# Patient Record
Sex: FEMALE | Race: WHITE | NOT HISPANIC OR LATINO | Employment: FULL TIME | URBAN - METROPOLITAN AREA
[De-identification: names, ages, dates, MRNs, and addresses within clinical notes are randomized per-mention and may not be internally consistent; named-entity substitution may affect disease eponyms.]

---

## 2023-11-01 ENCOUNTER — PHARMACY VISIT (OUTPATIENT)
Dept: PHARMACY | Facility: CLINIC | Age: 58
End: 2023-11-01
Payer: COMMERCIAL

## 2023-11-01 PROCEDURE — RXMED WILLOW AMBULATORY MEDICATION CHARGE

## 2023-11-01 RX ORDER — INFLUENZA VIRUS VACCINE 15; 15; 15; 15 UG/.5ML; UG/.5ML; UG/.5ML; UG/.5ML
SUSPENSION INTRAMUSCULAR
Qty: 0.5 ML | Refills: 0 | OUTPATIENT
Start: 2023-11-01

## 2023-11-19 DIAGNOSIS — F41.9 ANXIETY: Primary | ICD-10-CM

## 2023-11-20 ENCOUNTER — PHARMACY VISIT (OUTPATIENT)
Dept: PHARMACY | Facility: CLINIC | Age: 58
End: 2023-11-20
Payer: COMMERCIAL

## 2023-11-20 PROCEDURE — RXMED WILLOW AMBULATORY MEDICATION CHARGE

## 2023-11-20 RX ORDER — CITALOPRAM 20 MG/1
20 TABLET, FILM COATED ORAL DAILY
Qty: 90 TABLET | Refills: 2 | Status: SHIPPED | OUTPATIENT
Start: 2023-11-20 | End: 2023-12-08 | Stop reason: SDUPTHER

## 2023-12-08 ENCOUNTER — OFFICE VISIT (OUTPATIENT)
Dept: PRIMARY CARE | Facility: CLINIC | Age: 58
End: 2023-12-08
Payer: COMMERCIAL

## 2023-12-08 VITALS
WEIGHT: 190 LBS | OXYGEN SATURATION: 95 % | SYSTOLIC BLOOD PRESSURE: 113 MMHG | BODY MASS INDEX: 29.82 KG/M2 | DIASTOLIC BLOOD PRESSURE: 76 MMHG | HEART RATE: 75 BPM | HEIGHT: 67 IN

## 2023-12-08 DIAGNOSIS — E03.9 ADULT HYPOTHYROIDISM: ICD-10-CM

## 2023-12-08 DIAGNOSIS — Z00.00 ROUTINE GENERAL MEDICAL EXAMINATION AT A HEALTH CARE FACILITY: ICD-10-CM

## 2023-12-08 DIAGNOSIS — F41.9 ANXIETY: ICD-10-CM

## 2023-12-08 DIAGNOSIS — Z12.31 BREAST CANCER SCREENING BY MAMMOGRAM: Primary | ICD-10-CM

## 2023-12-08 LAB
25(OH)D3 SERPL-MCNC: 36 NG/ML (ref 30–100)
ALBUMIN SERPL BCP-MCNC: 4.6 G/DL (ref 3.4–5)
ALP SERPL-CCNC: 102 U/L (ref 33–110)
ALT SERPL W P-5'-P-CCNC: 16 U/L (ref 7–45)
ANION GAP SERPL CALC-SCNC: 12 MMOL/L (ref 10–20)
AST SERPL W P-5'-P-CCNC: 18 U/L (ref 9–39)
BASOPHILS # BLD AUTO: 0.04 X10*3/UL (ref 0–0.1)
BASOPHILS NFR BLD AUTO: 0.6 %
BILIRUB SERPL-MCNC: 0.4 MG/DL (ref 0–1.2)
BUN SERPL-MCNC: 12 MG/DL (ref 6–23)
CALCIUM SERPL-MCNC: 10.2 MG/DL (ref 8.6–10.6)
CHLORIDE SERPL-SCNC: 104 MMOL/L (ref 98–107)
CHOLEST SERPL-MCNC: 192 MG/DL (ref 0–199)
CHOLESTEROL/HDL RATIO: 4.5
CO2 SERPL-SCNC: 29 MMOL/L (ref 21–32)
CREAT SERPL-MCNC: 0.83 MG/DL (ref 0.5–1.05)
EOSINOPHIL # BLD AUTO: 0.18 X10*3/UL (ref 0–0.7)
EOSINOPHIL NFR BLD AUTO: 2.9 %
ERYTHROCYTE [DISTWIDTH] IN BLOOD BY AUTOMATED COUNT: 13 % (ref 11.5–14.5)
EST. AVERAGE GLUCOSE BLD GHB EST-MCNC: 94 MG/DL
GFR SERPL CREATININE-BSD FRML MDRD: 83 ML/MIN/1.73M*2
GLUCOSE SERPL-MCNC: 94 MG/DL (ref 74–99)
HBA1C MFR BLD: 4.9 %
HCT VFR BLD AUTO: 42.2 % (ref 36–46)
HDLC SERPL-MCNC: 42.8 MG/DL
HGB BLD-MCNC: 13.7 G/DL (ref 12–16)
IMM GRANULOCYTES # BLD AUTO: 0.01 X10*3/UL (ref 0–0.7)
IMM GRANULOCYTES NFR BLD AUTO: 0.2 % (ref 0–0.9)
LDLC SERPL CALC-MCNC: 97 MG/DL
LYMPHOCYTES # BLD AUTO: 2.14 X10*3/UL (ref 1.2–4.8)
LYMPHOCYTES NFR BLD AUTO: 34 %
MCH RBC QN AUTO: 30.9 PG (ref 26–34)
MCHC RBC AUTO-ENTMCNC: 32.5 G/DL (ref 32–36)
MCV RBC AUTO: 95 FL (ref 80–100)
MONOCYTES # BLD AUTO: 0.54 X10*3/UL (ref 0.1–1)
MONOCYTES NFR BLD AUTO: 8.6 %
NEUTROPHILS # BLD AUTO: 3.38 X10*3/UL (ref 1.2–7.7)
NEUTROPHILS NFR BLD AUTO: 53.7 %
NON HDL CHOLESTEROL: 149 MG/DL (ref 0–149)
NRBC BLD-RTO: 0 /100 WBCS (ref 0–0)
PLATELET # BLD AUTO: 254 X10*3/UL (ref 150–450)
POTASSIUM SERPL-SCNC: 4.7 MMOL/L (ref 3.5–5.3)
PROT SERPL-MCNC: 7.2 G/DL (ref 6.4–8.2)
RBC # BLD AUTO: 4.44 X10*6/UL (ref 4–5.2)
SODIUM SERPL-SCNC: 140 MMOL/L (ref 136–145)
TRIGL SERPL-MCNC: 259 MG/DL (ref 0–149)
TSH SERPL-ACNC: 0.82 MIU/L (ref 0.44–3.98)
VLDL: 52 MG/DL (ref 0–40)
WBC # BLD AUTO: 6.3 X10*3/UL (ref 4.4–11.3)

## 2023-12-08 PROCEDURE — 85025 COMPLETE CBC W/AUTO DIFF WBC: CPT

## 2023-12-08 PROCEDURE — 83036 HEMOGLOBIN GLYCOSYLATED A1C: CPT

## 2023-12-08 PROCEDURE — 82306 VITAMIN D 25 HYDROXY: CPT

## 2023-12-08 PROCEDURE — 36415 COLL VENOUS BLD VENIPUNCTURE: CPT

## 2023-12-08 PROCEDURE — 99396 PREV VISIT EST AGE 40-64: CPT | Performed by: INTERNAL MEDICINE

## 2023-12-08 PROCEDURE — 80061 LIPID PANEL: CPT

## 2023-12-08 PROCEDURE — 80053 COMPREHEN METABOLIC PANEL: CPT

## 2023-12-08 PROCEDURE — 84443 ASSAY THYROID STIM HORMONE: CPT

## 2023-12-08 RX ORDER — LEVOTHYROXINE SODIUM 75 UG/1
TABLET ORAL
Qty: 65 TABLET | Refills: 2 | Status: SHIPPED | OUTPATIENT
Start: 2023-12-08 | End: 2024-12-06

## 2023-12-08 RX ORDER — LORAZEPAM 0.5 MG/1
0.5 TABLET ORAL DAILY PRN
Qty: 30 TABLET | Refills: 0 | Status: SHIPPED | OUTPATIENT
Start: 2023-12-08

## 2023-12-08 RX ORDER — CITALOPRAM 20 MG/1
20 TABLET, FILM COATED ORAL DAILY
Qty: 90 TABLET | Refills: 2 | Status: SHIPPED | OUTPATIENT
Start: 2023-12-08 | End: 2024-12-06

## 2023-12-08 RX ORDER — LEVOTHYROXINE SODIUM 88 UG/1
TABLET ORAL
Qty: 25 TABLET | Refills: 2 | Status: SHIPPED | OUTPATIENT
Start: 2023-12-08 | End: 2024-12-06

## 2023-12-08 NOTE — PROGRESS NOTES
Chery Long is a 57 yo F presenting for wellness visit.      1. BHARGAV   -Wellbutrin 300   -citalopram 20 mg     2. Hypothryoidism   -Synthroid 75 5 days and 88 mcg 2 days   -due repeat tfts      3. R finger fx 8/2021      4. GERD  -mild intermittent symptoms 2x per week. no alarm symptoms  -continue PRN pepcid    5. Atopic dermatitis   -FU derm   -started Dupixent in interval      #HM   -due labs   -mammo due 1.19.24 -tdap 2016   -shingrix utd   -covid utd   -cscope 8/18 - 10 years   -pap 2019 with gyne       67   196 --> 190       FH   Adopted but did find birth mother this year; mother passed in 50s from metastatic melanoma, had asthma and obesity as well.        Graduate degree in business; one daughter is 17 yo - senior in  applying to [employer]     Gen Aox3 NAD well appearing   HEENT mmm pharynx clear no cervical LAD   Eyes sclerae clear   CV rrr nl s1/2 no m/r/g  Pulm CTAB no adventitious sounds   Ext warm dry no edema 2+ DP pulse       Chery Long is a 57 yo F presenting for wellness visit.      1. BHARGAV   -Wellbutrin 300   -citalopram 20 mg  -very rare lorazepam PRN for breakthrough sxs   -last rx 30 tabs in 12.7.22   -will renew same      2. Hypothryoidism   -Synthroid 75 5 days and 88 mcg 2 days   -due repeat tfts      3. R finger fx 8/2021      4. GERD  -mild intermittent symptoms 2x per week. no alarm symptoms  -continue PRN pepcid    5. Atopic dermatitis   -FU derm   -started Dupixent in interval      #HM   -due labs   -mammo due 1.19.24 -tdap 2016   -shingrix utd   -covid utd   -cscope 8/18 - 10 years   -pap 2019 with gyne

## 2023-12-08 NOTE — PATIENT INSTRUCTIONS
Chery,     We are doing full labs today.   Call [phone] to schedule mammogram 1-19-24 or later.   See me in a year, sooner if needed!     CL

## 2023-12-11 ENCOUNTER — PHARMACY VISIT (OUTPATIENT)
Dept: PHARMACY | Facility: CLINIC | Age: 58
End: 2023-12-11
Payer: COMMERCIAL

## 2023-12-11 PROCEDURE — RXMED WILLOW AMBULATORY MEDICATION CHARGE

## 2023-12-19 ENCOUNTER — OFFICE VISIT (OUTPATIENT)
Dept: OBSTETRICS AND GYNECOLOGY | Facility: CLINIC | Age: 58
End: 2023-12-19
Payer: COMMERCIAL

## 2023-12-19 VITALS
WEIGHT: 189 LBS | DIASTOLIC BLOOD PRESSURE: 70 MMHG | SYSTOLIC BLOOD PRESSURE: 112 MMHG | BODY MASS INDEX: 29.66 KG/M2 | HEIGHT: 67 IN

## 2023-12-19 DIAGNOSIS — Z01.419 ENCOUNTER FOR WELL WOMAN EXAM WITH ROUTINE GYNECOLOGICAL EXAM: Primary | ICD-10-CM

## 2023-12-19 DIAGNOSIS — Z12.31 ENCOUNTER FOR SCREENING MAMMOGRAM FOR BREAST CANCER: ICD-10-CM

## 2023-12-19 PROCEDURE — 1036F TOBACCO NON-USER: CPT | Performed by: OBSTETRICS & GYNECOLOGY

## 2023-12-19 PROCEDURE — 99396 PREV VISIT EST AGE 40-64: CPT | Performed by: OBSTETRICS & GYNECOLOGY

## 2023-12-19 NOTE — PROGRESS NOTES
"PAP 19 NEG HPV NEG  MAMMO 23  DEXA NEVER  COLON 18 NL/ 10 years    ASSESSMENT/PLAN      SUBJECTIVE    HPI    57 yo G0 (1 adopted dtr) for routine well woman exam.  Last seen 2022.   Menopausal since age 51. No bleeding.   Continues on Buproprion for vaginismus, depression,  Up to date with PCP visits.  Denies any other intervening medical or surgical issues.   , non smoker, rare ETOH.  Adopted and this past year found out about birth family history. Birth mother  age 55 malignant melanoma.      Review of Systems    Constitutional: no fever, no chills, no recent weight gain, no recent weight loss and no fatigue.   Eyes: no eye pain, no vision problems and no dryness of the eyes.   ENT: no hearing loss, no nosebleeds and no sinus congestion.   Cardiovascular: no chest pain, no palpitations and no orthopnea.   Respiratory: no shortness of breath, no cough and no wheezing.   Gastrointestinal: no abdominal pain, no constipation, no nausea, no diarrhea and no vomiting.   Genitourinary: no dysuria, no urinary incontinence, no vaginal dryness, no vaginal itching, no dyspareunia, no pelvic pain, no dysmenorrhea, no sexual problems, no change in urinary frequency, no vaginal discharge, no unexplained vaginal bleeding and no lesion/sore.   Musculoskeletal: no back pain, no joint swelling and no leg edema.   Integumentary: no rashes, no skin lesions, no nipple discharge, no breast pain and no breast lump.   Neurological: no headache, no numbness and no dizziness.   Psychiatric: no sleep disturbances, + anxiety and depression.   Endocrine: no hot flashes, no loss of hair and no hirsutism.   Hematologic/Lymphatic: no swollen glands, no tendency for easy bleeding and no tendency for easy bruising.   All other systems have been reviewed and are negative for complaint.     OBJECTIVE    /70   Ht 1.702 m (5' 7\")   Wt 85.7 kg (189 lb)   BMI 29.60 kg/m²      Physical Exam     Constitutional: Alert " and in no acute distress. Well developed, well nourished   Head and Face: Head and face: normal   Eyes: Normal external exam - nonicteric sclera, extraocular movements intact (EOMI) and no ptosis.   Ears, Nose, Mouth, and Throat: External inspection of ears and nose: normal   Neck: no neck asymmetry. Supple and thyroid not enlarged and there were no palpable thyroid nodules   Cardiovascular: Heart rate and rhythm were normal, normal S1 and S2, no gallops, and no murmurs   Pulmonary: No respiratory distress and clear bilateral breath sounds   Chest: Breasts: normal appearance, no nipple discharge and no skin changes and palpation of breasts and axillae: no palpable mass and no axillary lymphadenopathy   Abdomen: soft nontender; no abdominal mass palpated, no organomegaly and no hernias   Genitourinary: external genitalia: normal, no inguinal lymphadenopathy, Bartholin's urethral and Sealy's glands: normal, urethra: normal, bladder: normal on palpation and perianal area: normal   Vagina: normal.   Cervix: Normal appearing without lesions.  Uterus: Normal, mobile, nontender.  Right Adnexa/parametria: Normal.   Left Adnexa/parametria: Normal.   Skin: normal skin color and pigmentation, normal skin turgor, and no rash.   Psychiatric: alert and oriented x 3., affect normal to patient baseline and mood: appropriate      Jojo Berger MD

## 2024-01-10 PROCEDURE — RXMED WILLOW AMBULATORY MEDICATION CHARGE

## 2024-01-11 ENCOUNTER — PHARMACY VISIT (OUTPATIENT)
Dept: PHARMACY | Facility: CLINIC | Age: 59
End: 2024-01-11
Payer: COMMERCIAL

## 2024-01-22 ENCOUNTER — HOSPITAL ENCOUNTER (OUTPATIENT)
Dept: RADIOLOGY | Facility: CLINIC | Age: 59
Discharge: HOME | End: 2024-01-22
Payer: COMMERCIAL

## 2024-01-22 VITALS — BODY MASS INDEX: 29.65 KG/M2 | WEIGHT: 188.93 LBS | HEIGHT: 67 IN

## 2024-01-22 DIAGNOSIS — Z12.31 ENCOUNTER FOR SCREENING MAMMOGRAM FOR BREAST CANCER: ICD-10-CM

## 2024-01-22 PROCEDURE — 77067 SCR MAMMO BI INCL CAD: CPT | Performed by: RADIOLOGY

## 2024-01-22 PROCEDURE — 77063 BREAST TOMOSYNTHESIS BI: CPT | Performed by: RADIOLOGY

## 2024-01-22 PROCEDURE — 77067 SCR MAMMO BI INCL CAD: CPT

## 2024-02-05 PROCEDURE — RXMED WILLOW AMBULATORY MEDICATION CHARGE

## 2024-02-07 ENCOUNTER — PHARMACY VISIT (OUTPATIENT)
Dept: PHARMACY | Facility: CLINIC | Age: 59
End: 2024-02-07
Payer: COMMERCIAL

## 2024-02-29 ENCOUNTER — APPOINTMENT (OUTPATIENT)
Dept: OPHTHALMOLOGY | Facility: CLINIC | Age: 59
End: 2024-02-29
Payer: COMMERCIAL

## 2024-03-05 PROBLEM — N64.4 BREAST PAIN: Status: ACTIVE | Noted: 2024-03-05

## 2024-03-05 PROBLEM — S16.1XXA STRAIN OF NECK MUSCLE: Status: ACTIVE | Noted: 2024-03-05

## 2024-03-05 PROBLEM — R05.9 COUGH: Status: ACTIVE | Noted: 2024-03-05

## 2024-03-05 PROBLEM — R39.89 SENSATION OF PRESSURE IN BLADDER AREA: Status: ACTIVE | Noted: 2024-03-05

## 2024-03-05 PROBLEM — N95.2 ATROPHIC VAGINITIS: Status: ACTIVE | Noted: 2024-03-05

## 2024-03-05 PROBLEM — F32.A DEPRESSIVE DISORDER: Status: ACTIVE | Noted: 2024-03-05
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
## 2024-03-05 PROBLEM — F41.1 GENERALIZED ANXIETY DISORDER: Status: ACTIVE | Noted: 2024-03-05

## 2024-03-05 PROBLEM — N94.819 VULVODYNIA: Status: ACTIVE | Noted: 2024-03-05

## 2024-03-05 PROBLEM — R45.89 DEPRESSED MOOD: Status: ACTIVE | Noted: 2024-03-05

## 2024-03-05 PROBLEM — J45.990 EXERCISE-INDUCED ASTHMA (HHS-HCC): Status: ACTIVE | Noted: 2024-03-05

## 2024-03-05 PROBLEM — N95.1 MENOPAUSAL SYMPTOMS: Status: ACTIVE | Noted: 2024-03-05

## 2024-03-05 PROBLEM — G89.29 BACK PAIN, CHRONIC: Status: ACTIVE | Noted: 2024-03-05

## 2024-03-05 PROBLEM — S62.628A CLOSED FRACTURE OF MIDDLE PHALANX OF LITTLE FINGER: Status: ACTIVE | Noted: 2024-03-05

## 2024-03-05 PROBLEM — J01.90 ACUTE SINUSITIS: Status: ACTIVE | Noted: 2024-03-05

## 2024-03-05 PROBLEM — S16.1XXA CERVICAL STRAIN: Status: ACTIVE | Noted: 2024-03-05

## 2024-03-05 PROBLEM — J34.89 SINUS PRESSURE: Status: ACTIVE | Noted: 2024-03-05

## 2024-03-05 PROBLEM — R92.8 ABNORMALITY OF RIGHT BREAST ON SCREENING MAMMOGRAM: Status: ACTIVE | Noted: 2024-03-05

## 2024-03-05 PROBLEM — S80.02XA CONTUSION OF LEFT KNEE: Status: ACTIVE | Noted: 2024-03-05

## 2024-03-05 PROBLEM — R92.8 ABNORMAL MAMMOGRAM: Status: ACTIVE | Noted: 2023-01-19

## 2024-03-05 PROBLEM — J30.9 ALLERGIC RHINITIS: Status: ACTIVE | Noted: 2024-03-05

## 2024-03-05 PROBLEM — M54.9 BACK PAIN, CHRONIC: Status: ACTIVE | Noted: 2024-03-05

## 2024-03-05 PROBLEM — R14.0 ABDOMINAL BLOATING: Status: ACTIVE | Noted: 2024-03-05

## 2024-03-05 PROBLEM — E03.9 ADULT HYPOTHYROIDISM: Status: ACTIVE | Noted: 2024-03-05

## 2024-03-05 PROBLEM — R42 DIZZINESS: Status: ACTIVE | Noted: 2024-03-05

## 2024-03-05 PROBLEM — S69.91XA FINGER INJURY, RIGHT, INITIAL ENCOUNTER: Status: ACTIVE | Noted: 2024-03-05

## 2024-03-05 PROBLEM — N89.8 VAGINAL DISCHARGE: Status: ACTIVE | Noted: 2024-03-05

## 2024-03-05 PROBLEM — H44.50 DEGENERATIVE DISORDER OF EYE: Status: ACTIVE | Noted: 2024-03-05

## 2024-03-05 PROBLEM — J30.9 CHRONIC ALLERGIC RHINITIS: Status: ACTIVE | Noted: 2024-03-05

## 2024-03-05 PROBLEM — R94.6 ABNORMAL THYROID FUNCTION TEST: Status: ACTIVE | Noted: 2024-03-05

## 2024-03-05 PROBLEM — M25.562 LEFT KNEE PAIN: Status: ACTIVE | Noted: 2024-03-05

## 2024-03-05 PROBLEM — B37.31 VULVOVAGINAL CANDIDIASIS: Status: ACTIVE | Noted: 2024-03-05

## 2024-03-05 PROBLEM — H81.90 VESTIBULAR DISORDER: Status: ACTIVE | Noted: 2024-03-05

## 2024-03-05 RX ORDER — MULTIVIT/FOLIC ACID/HERBAL 223 400 MCG
TABLET ORAL
COMMUNITY
End: 2024-03-07 | Stop reason: ALTCHOICE

## 2024-03-05 RX ORDER — ALBUTEROL SULFATE 90 UG/1
AEROSOL, METERED RESPIRATORY (INHALATION)
COMMUNITY
Start: 2017-04-04

## 2024-03-05 RX ORDER — AZELASTINE HCL 205.5 UG/1
SPRAY NASAL
COMMUNITY
Start: 2018-12-18 | End: 2024-03-07 | Stop reason: ALTCHOICE

## 2024-03-07 ENCOUNTER — OFFICE VISIT (OUTPATIENT)
Dept: OTOLARYNGOLOGY | Facility: CLINIC | Age: 59
End: 2024-03-07
Payer: COMMERCIAL

## 2024-03-07 VITALS
OXYGEN SATURATION: 98 % | HEART RATE: 67 BPM | SYSTOLIC BLOOD PRESSURE: 113 MMHG | RESPIRATION RATE: 16 BRPM | DIASTOLIC BLOOD PRESSURE: 72 MMHG | TEMPERATURE: 98.6 F

## 2024-03-07 DIAGNOSIS — J34.89 NASAL DRYNESS: ICD-10-CM

## 2024-03-07 DIAGNOSIS — J34.2 NASAL SEPTAL DEVIATION: ICD-10-CM

## 2024-03-07 DIAGNOSIS — R06.83 SNORING: ICD-10-CM

## 2024-03-07 DIAGNOSIS — R44.8 FACIAL PRESSURE: ICD-10-CM

## 2024-03-07 DIAGNOSIS — R04.0 EPISTAXIS: ICD-10-CM

## 2024-03-07 DIAGNOSIS — J31.0 CHRONIC RHINITIS: Primary | ICD-10-CM

## 2024-03-07 PROCEDURE — 99213 OFFICE O/P EST LOW 20 MIN: CPT | Performed by: STUDENT IN AN ORGANIZED HEALTH CARE EDUCATION/TRAINING PROGRAM

## 2024-03-07 PROCEDURE — 99203 OFFICE O/P NEW LOW 30 MIN: CPT | Performed by: STUDENT IN AN ORGANIZED HEALTH CARE EDUCATION/TRAINING PROGRAM

## 2024-03-07 PROCEDURE — 1036F TOBACCO NON-USER: CPT | Performed by: STUDENT IN AN ORGANIZED HEALTH CARE EDUCATION/TRAINING PROGRAM

## 2024-03-07 RX ORDER — RUXOLITINIB 15 MG/G
CREAM TOPICAL
COMMUNITY
Start: 2024-02-27

## 2024-03-07 RX ORDER — TRIAMCINOLONE ACETONIDE 55 UG/1
2 SPRAY, METERED NASAL DAILY
Qty: 16.9 ML | Refills: 11 | Status: SHIPPED | OUTPATIENT
Start: 2024-03-07 | End: 2025-03-07

## 2024-03-07 SDOH — ECONOMIC STABILITY: FOOD INSECURITY: WITHIN THE PAST 12 MONTHS, THE FOOD YOU BOUGHT JUST DIDN'T LAST AND YOU DIDN'T HAVE MONEY TO GET MORE.: NEVER TRUE

## 2024-03-07 SDOH — ECONOMIC STABILITY: FOOD INSECURITY: WITHIN THE PAST 12 MONTHS, YOU WORRIED THAT YOUR FOOD WOULD RUN OUT BEFORE YOU GOT MONEY TO BUY MORE.: NEVER TRUE

## 2024-03-07 ASSESSMENT — LIFESTYLE VARIABLES
HOW OFTEN DO YOU HAVE A DRINK CONTAINING ALCOHOL: MONTHLY OR LESS
AUDIT-C TOTAL SCORE: 1
HOW OFTEN DO YOU HAVE SIX OR MORE DRINKS ON ONE OCCASION: NEVER
HOW MANY STANDARD DRINKS CONTAINING ALCOHOL DO YOU HAVE ON A TYPICAL DAY: 1 OR 2
SKIP TO QUESTIONS 9-10: 1

## 2024-03-07 ASSESSMENT — COLUMBIA-SUICIDE SEVERITY RATING SCALE - C-SSRS
6. HAVE YOU EVER DONE ANYTHING, STARTED TO DO ANYTHING, OR PREPARED TO DO ANYTHING TO END YOUR LIFE?: NO
2. HAVE YOU ACTUALLY HAD ANY THOUGHTS OF KILLING YOURSELF?: NO
1. IN THE PAST MONTH, HAVE YOU WISHED YOU WERE DEAD OR WISHED YOU COULD GO TO SLEEP AND NOT WAKE UP?: NO

## 2024-03-07 ASSESSMENT — ENCOUNTER SYMPTOMS
LOSS OF SENSATION IN FEET: 0
OCCASIONAL FEELINGS OF UNSTEADINESS: 0
DEPRESSION: 0

## 2024-03-07 ASSESSMENT — PAIN SCALES - GENERAL: PAINLEVEL: 2

## 2024-03-07 ASSESSMENT — PATIENT HEALTH QUESTIONNAIRE - PHQ9
SUM OF ALL RESPONSES TO PHQ9 QUESTIONS 1 AND 2: 0
2. FEELING DOWN, DEPRESSED OR HOPELESS: NOT AT ALL
1. LITTLE INTEREST OR PLEASURE IN DOING THINGS: NOT AT ALL

## 2024-03-07 NOTE — PROGRESS NOTES
"SUBJECTIVE  Patient ID: Chery Long is a 58 y.o. female who presents for New Patient Visit (Chery is here for a new visit regarding chronic pain and pressure in her sinuses.  ).    The patient reports chronic issues with sinus issues. Notes history of seasonal/environmental allergies for which she had IT in the past; 10 years ago - felt that these were helpful for seasonal symptoms. She notes lots of sinus congestion, nasal drainage, and facial pressure (mostly left maxillary - but some bilaterally). Sense of smell is \"really good.\" Frequent epistaxis in the winter. Has some exercise induced asthma. They deny a history of nasal/sinus surgery/trauma.    Previously tried saline irrigations, steroid spray (only uses during seasonal allergy seasons), and oral antihistamine (seasonally).    She also reports more recently her  has noted an increase in snoring.    Review of Systems  Complete ROS negative except as noted above or on patient intake form and as above.    OBJECTIVE  Physical Exam  CONSTITUTIONAL: Well appearing female who appears stated age. BMI 29.6.  PSYCHIATRIC: Alert, appropriate mood and affect.  RESPIRATORY: Normal inspiration and expiration and chest wall expansion; no use of accessory muscles to breathe.  VOICE: Clear speech without hoarseness. No stridor nor stertor.  HEAD, FACE, AND SKIN: Symmetric facial feature. No cutaneous masses or lesions were visualized. The parotid and submandibular glands were normal to palpation.  EYES: Pupils were equal in size and reactive to light. Extra-ocular muscle function was intact. No nystagmus was observed. Vision was grossly intact.  EARS: External ears were normally formed with no lesions. The external auditory canals were clear. The tympanic membranes were intact. Myringosclersosis with mild retraction of the right TM. No significant retraction pockets nor effusions were appreciated.  NOSE: Nasal dorsum was midline. Nasal diamond are quite narrow " bilaterally. Dry nasal mucosa with evidence of recent oozing. Anterior rhinoscopy demonstrated a septum deviated to the right. Inferior turbinates were mildly hypertrophied. Questionable polypoid edema of the left middle meatus.  ORAL CAVITY: Lips were without lesions. Moist mucous membranes. No lesions appreciated along the gingiva, oral mucosa, nor tongue.  DENTITION: Grossly normal without obvious infection nor inflammation. Mild class II malocclusion.  OROPHARYNX: Questionable exophytic papilloma vs tonsil along the left soft palate. The uvula was normal appearing. The tonsils were 1+. Class I airway.  NECK: Visualization and palpation of the neck revealed no mass lesions, no thyromegaly or thyroid masses. No cutaneous lesions appreciated.  LYMPHATICS (CERVICAL): There were no palpable lymph nodes in the posterior triangle, submandibular triangle, jugulodigastric region, nor central neck.  NEUROLOGIC: Cranial nerves III, IV, and VI were noted to be intact via extra-ocular muscle movement testing. Cranial nerve V was noted to be intact to soft touch bilaterally. Cranial nerve VII was noted to be intact and symmetric by facial movement. Cranial nerve VIII was tested with normal voice examination and revealed grossly normal hearing. Cranial nerves IX and X noted to be intact by palatal movement. Cranial nerve XI noted to be intact via shoulder shrug.  Cranial nerve XII noted to be intact with active and symmetric tongue movement.      ASSESSMENT/PLAN  Diagnoses and all orders for this visit:  Chronic rhinitis  -     triamcinolone (Nasacort) 55 mcg nasal inhaler; Administer 2 sprays into each nostril once daily.  Snoring  Nasal septal deviation  Epistaxis  Nasal dryness  Facial pressure    58 y.o. female presenting with history of environmental allergies.    1.  Chronic/allergic rhinitis, facial pressure, nasal congestion/drainage, nasal dryness/epistaxis, questionable polypoid edema  The patient is noting  longstanding history of environmental allergies now status post immunotherapy some 10 years ago.  She reports that this was helpful with seasonal allergies but more recently has been having perennial symptoms including facial pressure, nasal congestion, and nasal drainage. On exam they have very dry nasal mucosa with narrow nasal sill's limiting evaluation of the nasal cavities.  There appears to be some generalized edema as well as potential polypoid edema of the left middle meatus.    Endoscopy: Deferred by patient  Imaging: None available    I discussed my findings with the patient and offered reassurance and counseling. The patient has evidence of chronic inflammation of the nasal cavity as appreciated on physical exam. I recommended the following therapeutic measures to address the issues noted above:    -Start nasal steroid spray 2 sprays daily bilaterally. I recommended at least a one-month trial. We discussed the proper method for directing the nasal spray toward the lateral nasal wall for optimal results.  -Start sinonasal saline irrigations to be performed prior to application of nasal steroid spray. Ideally this will be performed daily.  -Follow up in 2-3 months.  -We will discuss further options should medical management not succeed.    2.  Snoring  Patient also reports more recent onset of snoring.  Her physical exam does not have significant signs of obstruction other than at the nasal cavity.  For now we will focus on treatment of chronic rhinitis and will reassess with nasal endoscopy as needed in the future.    This note was created using speech recognition transcription software. Despite proofreading, typographical errors may be present that affect the meaning of the content. Please contact my office with any questions.

## 2024-03-09 DIAGNOSIS — F32.89 OTHER DEPRESSION: Primary | ICD-10-CM

## 2024-03-09 PROCEDURE — RXMED WILLOW AMBULATORY MEDICATION CHARGE

## 2024-03-10 PROCEDURE — RXMED WILLOW AMBULATORY MEDICATION CHARGE

## 2024-03-11 PROCEDURE — RXMED WILLOW AMBULATORY MEDICATION CHARGE

## 2024-03-11 RX ORDER — BUPROPION HYDROCHLORIDE 300 MG/1
300 TABLET ORAL DAILY
Qty: 90 TABLET | Refills: 3 | Status: SHIPPED | OUTPATIENT
Start: 2024-03-11 | End: 2025-03-10

## 2024-03-15 ENCOUNTER — PHARMACY VISIT (OUTPATIENT)
Dept: PHARMACY | Facility: CLINIC | Age: 59
End: 2024-03-15
Payer: COMMERCIAL

## 2024-03-18 ENCOUNTER — SPECIALTY PHARMACY (OUTPATIENT)
Dept: PHARMACY | Facility: CLINIC | Age: 59
End: 2024-03-18

## 2024-03-19 ENCOUNTER — SPECIALTY PHARMACY (OUTPATIENT)
Dept: PHARMACY | Facility: CLINIC | Age: 59
End: 2024-03-19

## 2024-03-29 ENCOUNTER — SPECIALTY PHARMACY (OUTPATIENT)
Dept: PHARMACY | Facility: CLINIC | Age: 59
End: 2024-03-29

## 2024-06-04 ENCOUNTER — SPECIALTY PHARMACY (OUTPATIENT)
Dept: PHARMACY | Facility: CLINIC | Age: 59
End: 2024-06-04

## 2024-06-04 ENCOUNTER — PHARMACY VISIT (OUTPATIENT)
Dept: PHARMACY | Facility: CLINIC | Age: 59
End: 2024-06-04
Payer: COMMERCIAL

## 2024-06-04 PROCEDURE — RXMED WILLOW AMBULATORY MEDICATION CHARGE

## 2024-06-05 ENCOUNTER — SPECIALTY PHARMACY (OUTPATIENT)
Dept: PHARMACY | Facility: CLINIC | Age: 59
End: 2024-06-05

## 2024-06-05 NOTE — PROGRESS NOTES
[facility] Clinical Note    Chery Long is a 59 y.o. female, who is on the specialty pharmacy service of: [facility].  Chery Long is taking: dupixent.     Chery was contacted on 6/5/2024.    Refer to the encounter summary report for documentation details about patient counseling and education.      Impression/Plan  Is patient high risk? (potential patients:  pregnancy, geriatric, pediatric)   no  Is laboratory follow-up needed? no  Is a clinical intervention needed?  no  Next assessment date?  9/5/24  Additional comments:    Medication Adherence  The importance of adherence was discussed with the patient and they were advised to take the medication as prescribed by their provider. Chery was encouraged to call her physician's office if they have a question regarding a missed dose.        Patient advised to contact the pharmacy if there are any changes to her medication list, including prescriptions, OTC medications, herbal products, or supplements. Patient was advised of [facility]’s dispensing process, refill timeline, contact information ([phone]), and patient management follow up. Patient confirmed understanding of education conducted during assessment. All patient questions and concerns were addressed to the best of my ability. Patient was encouraged to contact the specialty pharmacy with any questions or concerns.    Confirmed follow-up outreaches are properly scheduled. Reviewed goals of therapy in the program targets.    Santiago Brandt, MelquiadesD

## 2024-06-10 PROCEDURE — RXMED WILLOW AMBULATORY MEDICATION CHARGE

## 2024-06-11 ENCOUNTER — PHARMACY VISIT (OUTPATIENT)
Dept: PHARMACY | Facility: CLINIC | Age: 59
End: 2024-06-11
Payer: COMMERCIAL

## 2024-06-12 ENCOUNTER — PHARMACY VISIT (OUTPATIENT)
Dept: PHARMACY | Facility: CLINIC | Age: 59
End: 2024-06-12
Payer: COMMERCIAL

## 2024-06-12 PROCEDURE — RXMED WILLOW AMBULATORY MEDICATION CHARGE

## 2024-06-12 RX ORDER — FLUTICASONE PROPIONATE 50 MCG
SPRAY, SUSPENSION (ML) NASAL
Qty: 16 G | Refills: 0 | OUTPATIENT
Start: 2024-06-12

## 2024-06-12 RX ORDER — AMOXICILLIN AND CLAVULANATE POTASSIUM 875; 125 MG/1; MG/1
TABLET, FILM COATED ORAL
Qty: 14 TABLET | Refills: 0 | OUTPATIENT
Start: 2024-06-12

## 2024-06-25 PROCEDURE — RXMED WILLOW AMBULATORY MEDICATION CHARGE

## 2024-06-26 ENCOUNTER — PHARMACY VISIT (OUTPATIENT)
Dept: PHARMACY | Facility: CLINIC | Age: 59
End: 2024-06-26
Payer: COMMERCIAL

## 2024-06-28 ENCOUNTER — SPECIALTY PHARMACY (OUTPATIENT)
Dept: PHARMACY | Facility: CLINIC | Age: 59
End: 2024-06-28

## 2024-07-02 PROCEDURE — RXMED WILLOW AMBULATORY MEDICATION CHARGE

## 2024-07-11 ENCOUNTER — PHARMACY VISIT (OUTPATIENT)
Dept: PHARMACY | Facility: CLINIC | Age: 59
End: 2024-07-11
Payer: COMMERCIAL

## 2024-07-18 PROCEDURE — RXMED WILLOW AMBULATORY MEDICATION CHARGE

## 2024-07-19 ENCOUNTER — LAB (OUTPATIENT)
Dept: LAB | Facility: LAB | Age: 59
End: 2024-07-19
Payer: COMMERCIAL

## 2024-07-19 ENCOUNTER — PHARMACY VISIT (OUTPATIENT)
Dept: PHARMACY | Facility: CLINIC | Age: 59
End: 2024-07-19
Payer: COMMERCIAL

## 2024-07-19 DIAGNOSIS — R53.83 OTHER FATIGUE: ICD-10-CM

## 2024-07-19 LAB
25(OH)D3 SERPL-MCNC: 34 NG/ML (ref 30–100)
BASOPHILS # BLD AUTO: 0.03 X10*3/UL (ref 0–0.1)
BASOPHILS NFR BLD AUTO: 0.4 %
EOSINOPHIL # BLD AUTO: 0.12 X10*3/UL (ref 0–0.7)
EOSINOPHIL NFR BLD AUTO: 1.7 %
ERYTHROCYTE [DISTWIDTH] IN BLOOD BY AUTOMATED COUNT: 12.3 % (ref 11.5–14.5)
HCT VFR BLD AUTO: 43.1 % (ref 36–46)
HGB BLD-MCNC: 14.3 G/DL (ref 12–16)
IMM GRANULOCYTES # BLD AUTO: 0.01 X10*3/UL (ref 0–0.7)
IMM GRANULOCYTES NFR BLD AUTO: 0.1 % (ref 0–0.9)
IRON SATN MFR SERPL: 20 % (ref 25–45)
IRON SERPL-MCNC: 101 UG/DL (ref 35–150)
LYMPHOCYTES # BLD AUTO: 1.93 X10*3/UL (ref 1.2–4.8)
LYMPHOCYTES NFR BLD AUTO: 26.6 %
MCH RBC QN AUTO: 30.6 PG (ref 26–34)
MCHC RBC AUTO-ENTMCNC: 33.2 G/DL (ref 32–36)
MCV RBC AUTO: 92 FL (ref 80–100)
MONOCYTES # BLD AUTO: 0.53 X10*3/UL (ref 0.1–1)
MONOCYTES NFR BLD AUTO: 7.3 %
NEUTROPHILS # BLD AUTO: 4.64 X10*3/UL (ref 1.2–7.7)
NEUTROPHILS NFR BLD AUTO: 63.9 %
NRBC BLD-RTO: 0 /100 WBCS (ref 0–0)
PLATELET # BLD AUTO: 273 X10*3/UL (ref 150–450)
RBC # BLD AUTO: 4.67 X10*6/UL (ref 4–5.2)
TIBC SERPL-MCNC: 512 UG/DL (ref 240–445)
TSH SERPL-ACNC: 0.77 MIU/L (ref 0.44–3.98)
UIBC SERPL-MCNC: 411 UG/DL (ref 110–370)
VIT B12 SERPL-MCNC: 336 PG/ML (ref 211–911)
WBC # BLD AUTO: 7.3 X10*3/UL (ref 4.4–11.3)

## 2024-07-19 PROCEDURE — 85025 COMPLETE CBC W/AUTO DIFF WBC: CPT

## 2024-07-19 PROCEDURE — 83540 ASSAY OF IRON: CPT

## 2024-07-19 PROCEDURE — 36415 COLL VENOUS BLD VENIPUNCTURE: CPT

## 2024-07-19 PROCEDURE — 84443 ASSAY THYROID STIM HORMONE: CPT

## 2024-07-19 PROCEDURE — 83550 IRON BINDING TEST: CPT

## 2024-07-19 PROCEDURE — 82306 VITAMIN D 25 HYDROXY: CPT

## 2024-07-19 PROCEDURE — 82607 VITAMIN B-12: CPT

## 2024-08-06 PROCEDURE — RXMED WILLOW AMBULATORY MEDICATION CHARGE

## 2024-08-07 ENCOUNTER — PHARMACY VISIT (OUTPATIENT)
Dept: PHARMACY | Facility: CLINIC | Age: 59
End: 2024-08-07
Payer: COMMERCIAL

## 2024-08-08 ENCOUNTER — PHARMACY VISIT (OUTPATIENT)
Dept: PHARMACY | Facility: CLINIC | Age: 59
End: 2024-08-08
Payer: COMMERCIAL

## 2024-08-08 PROCEDURE — RXMED WILLOW AMBULATORY MEDICATION CHARGE

## 2024-08-12 ENCOUNTER — PHARMACY VISIT (OUTPATIENT)
Dept: PHARMACY | Facility: CLINIC | Age: 59
End: 2024-08-12
Payer: COMMERCIAL

## 2024-08-12 PROCEDURE — RXMED WILLOW AMBULATORY MEDICATION CHARGE

## 2024-08-15 PROCEDURE — RXMED WILLOW AMBULATORY MEDICATION CHARGE

## 2024-08-19 PROCEDURE — RXMED WILLOW AMBULATORY MEDICATION CHARGE

## 2024-08-20 ENCOUNTER — SPECIALTY PHARMACY (OUTPATIENT)
Dept: PHARMACY | Facility: CLINIC | Age: 59
End: 2024-08-20

## 2024-08-22 ENCOUNTER — PHARMACY VISIT (OUTPATIENT)
Dept: PHARMACY | Facility: CLINIC | Age: 59
End: 2024-08-22
Payer: COMMERCIAL

## 2024-08-28 ENCOUNTER — PHARMACY VISIT (OUTPATIENT)
Dept: PHARMACY | Facility: CLINIC | Age: 59
End: 2024-08-28
Payer: COMMERCIAL

## 2024-08-30 ENCOUNTER — SPECIALTY PHARMACY (OUTPATIENT)
Dept: PHARMACY | Facility: CLINIC | Age: 59
End: 2024-08-30

## 2024-08-30 NOTE — PROGRESS NOTES
[facility] Clinical Note    Chery Long is a 59 y.o. female, who is on the specialty pharmacy service for management of:  [facility].    Chery Long is taking: Dupixent.        Chery was contacted on 8/30/2024 at 3:39 PM for a virtual pharmacy visit with encounter number [id number] from:   [facility]  [facility] [facility] [address]  Dept: [phone]  Dept Fax: [phone]    Chery was offered a Telemedicine Video visit or Telephone visit.  Chery consented to a telephone visit, which was performed.    The most recent encounter visit with the referring prescriber Sahra Iverson was reviewed.  Pharmacy will continue to collaborate in the care of this patient with the referring prescriber Sahra Iverson.    General Assessment      Impression/Plan  IMPRESSION/PLAN:  Is patient high risk (potential patients:  pregnancy, geriatric, pediatric)?  no  Is laboratory follow-up needed? Per MD  Is a clinical intervention needed? no  Next reassessment date? 3 months  Additional comments:     Refer to the encounter summary report for documentation details about patient counseling and education.      Medication Adherence    The importance of adherence was discussed with the patient and they were advised to take the medication as prescribed by their provider. Patient was encouraged to call their physician's office if they have a question regarding a missed dose.     QOL/Patient Satisfaction  Rate your quality of life on scale of 1-10: 9  Rate your satisfaction with  Specialty Pharmacy on scale of 1-10: 10 - Completely satisfied      Patient was advised to contact the pharmacy if there are any changes to their medication list, including prescriptions, OTC medications, herbal products, or supplements. Patient was advised of [facility]'s dispensing process, refill timeline, contact information ([phone]), and  patient management follow up. Patient confirmed understanding of education conducted during assessment. All patient questions and concerns were addressed to the best of my ability. Patient was encouraged to contact the specialty pharmacy with any questions or concerns.    Confirmed follow-up outreaches are properly scheduled and reviewed goals of therapy with the patient.        Francis Wood, PharmD

## 2024-09-03 ENCOUNTER — APPOINTMENT (OUTPATIENT)
Dept: PRIMARY CARE | Facility: CLINIC | Age: 59
End: 2024-09-03
Payer: COMMERCIAL

## 2024-09-17 ENCOUNTER — APPOINTMENT (OUTPATIENT)
Dept: ENDOCRINOLOGY | Facility: CLINIC | Age: 59
End: 2024-09-17
Payer: COMMERCIAL

## 2024-09-24 PROCEDURE — RXMED WILLOW AMBULATORY MEDICATION CHARGE

## 2024-09-25 ENCOUNTER — PHARMACY VISIT (OUTPATIENT)
Dept: PHARMACY | Facility: CLINIC | Age: 59
End: 2024-09-25
Payer: COMMERCIAL

## 2024-09-25 PROCEDURE — RXMED WILLOW AMBULATORY MEDICATION CHARGE

## 2024-09-27 ENCOUNTER — PHARMACY VISIT (OUTPATIENT)
Dept: PHARMACY | Facility: CLINIC | Age: 59
End: 2024-09-27
Payer: COMMERCIAL

## 2024-10-01 ENCOUNTER — SPECIALTY PHARMACY (OUTPATIENT)
Dept: PHARMACY | Facility: CLINIC | Age: 59
End: 2024-10-01

## 2024-10-01 PROCEDURE — RXMED WILLOW AMBULATORY MEDICATION CHARGE

## 2024-10-02 ENCOUNTER — SPECIALTY PHARMACY (OUTPATIENT)
Dept: PHARMACY | Facility: CLINIC | Age: 59
End: 2024-10-02

## 2024-10-03 ENCOUNTER — TELEPHONE (OUTPATIENT)
Dept: OBSTETRICS AND GYNECOLOGY | Facility: CLINIC | Age: 59
End: 2024-10-03
Payer: COMMERCIAL

## 2024-10-03 DIAGNOSIS — Z12.31 ENCOUNTER FOR SCREENING MAMMOGRAM FOR BREAST CANCER: Primary | ICD-10-CM

## 2024-10-08 ENCOUNTER — PHARMACY VISIT (OUTPATIENT)
Dept: PHARMACY | Facility: CLINIC | Age: 59
End: 2024-10-08
Payer: COMMERCIAL

## 2024-10-10 PROCEDURE — RXMED WILLOW AMBULATORY MEDICATION CHARGE

## 2024-10-14 ENCOUNTER — PHARMACY VISIT (OUTPATIENT)
Dept: PHARMACY | Facility: CLINIC | Age: 59
End: 2024-10-14
Payer: COMMERCIAL

## 2024-10-17 ENCOUNTER — PHARMACY VISIT (OUTPATIENT)
Dept: PHARMACY | Facility: CLINIC | Age: 59
End: 2024-10-17
Payer: COMMERCIAL

## 2024-10-17 PROCEDURE — RXMED WILLOW AMBULATORY MEDICATION CHARGE

## 2024-10-17 RX ORDER — LORAZEPAM 0.5 MG/1
TABLET ORAL
Qty: 20 TABLET | Refills: 0 | OUTPATIENT
Start: 2024-10-17

## 2024-11-02 PROCEDURE — RXMED WILLOW AMBULATORY MEDICATION CHARGE

## 2024-11-06 ENCOUNTER — SPECIALTY PHARMACY (OUTPATIENT)
Dept: PHARMACY | Facility: CLINIC | Age: 59
End: 2024-11-06

## 2024-11-07 ENCOUNTER — PHARMACY VISIT (OUTPATIENT)
Dept: PHARMACY | Facility: CLINIC | Age: 59
End: 2024-11-07
Payer: COMMERCIAL

## 2024-11-09 ENCOUNTER — PHARMACY VISIT (OUTPATIENT)
Dept: PHARMACY | Facility: CLINIC | Age: 59
End: 2024-11-09
Payer: COMMERCIAL

## 2024-11-09 PROCEDURE — RXMED WILLOW AMBULATORY MEDICATION CHARGE

## 2024-11-16 ENCOUNTER — PHARMACY VISIT (OUTPATIENT)
Dept: PHARMACY | Facility: CLINIC | Age: 59
End: 2024-11-16
Payer: COMMERCIAL

## 2024-11-16 PROCEDURE — RXMED WILLOW AMBULATORY MEDICATION CHARGE

## 2024-11-16 RX ORDER — FLUCONAZOLE 150 MG/1
150 TABLET ORAL DAILY
Qty: 3 TABLET | Refills: 0 | OUTPATIENT
Start: 2024-11-16

## 2024-11-16 RX ORDER — METRONIDAZOLE 7.5 MG/G
1 GEL VAGINAL NIGHTLY
Qty: 70 G | Refills: 0 | OUTPATIENT
Start: 2024-11-16

## 2024-11-25 ENCOUNTER — APPOINTMENT (OUTPATIENT)
Dept: ENDOCRINOLOGY | Facility: CLINIC | Age: 59
End: 2024-11-25
Payer: COMMERCIAL

## 2024-11-29 ENCOUNTER — SPECIALTY PHARMACY (OUTPATIENT)
Dept: PHARMACY | Facility: CLINIC | Age: 59
End: 2024-11-29

## 2024-11-29 NOTE — PROGRESS NOTES
[facility] Clinical Note    Chery Long is a 59 y.o. female, who is on the specialty pharmacy service for management of:  [facility].    Chery Long is taking: Dupixent.    Medication Receipt Date: 11/8/24  Medication Start Date (planned or actual): Established on therapy    Chery was contacted on 11/29/2024 at 11:52 AM for a virtual pharmacy visit with encounter number [id number] from:   [facility]  [facility] [facility] [address]  Dept: [phone]  Dept Fax: [phone]    Chery was offered a Telemedicine Video visit or Telephone visit.  Chery consented to a telephone visit, which was performed.    The most recent encounter visit with the referring prescriber Sahra Iverson was reviewed.  Pharmacy will continue to collaborate in the care of this patient with the referring prescriber Sahra Iverson.    General Assessment      Impression/Plan  IMPRESSION/PLAN:  Is patient high risk (potential patients:  pregnancy, geriatric, pediatric)? No   Is laboratory follow-up needed? No  Is a clinical intervention needed? No  Next reassessment date? 2/27/25  Additional comments: N/A    Refer to the encounter summary report for documentation details about patient counseling and education.      Medication Adherence    The importance of adherence was discussed with the patient and they were advised to take the medication as prescribed by their provider. Patient was encouraged to call their physician's office if they have a question regarding a missed dose.     QOL/Patient Satisfaction  Rate your quality of life on scale of 1-10: -- (N/A)  Rate your satisfaction with  Specialty Pharmacy on scale of 1-10: 10 - Completely satisfied      Patient was advised to contact the pharmacy if there are any changes to their medication list, including prescriptions, OTC medications, herbal products, or supplements. Patient was advised of [facility]'s dispensing process, refill timeline, contact information ([phone]), and patient management follow up. Patient confirmed understanding of education conducted during assessment. All patient questions and concerns were addressed to the best of my ability. Patient was encouraged to contact the specialty pharmacy with any questions or concerns.    Confirmed follow-up outreaches are properly scheduled and reviewed goals of therapy with the patient.        NING FERNANDEZ, MelquiadesD

## 2024-12-02 ENCOUNTER — SPECIALTY PHARMACY (OUTPATIENT)
Dept: PHARMACY | Facility: CLINIC | Age: 59
End: 2024-12-02

## 2024-12-05 ENCOUNTER — PHARMACY VISIT (OUTPATIENT)
Dept: PHARMACY | Facility: CLINIC | Age: 59
End: 2024-12-05
Payer: COMMERCIAL

## 2024-12-05 PROCEDURE — RXMED WILLOW AMBULATORY MEDICATION CHARGE

## 2024-12-09 ENCOUNTER — SPECIALTY PHARMACY (OUTPATIENT)
Dept: PHARMACY | Facility: CLINIC | Age: 59
End: 2024-12-09

## 2024-12-09 NOTE — PROGRESS NOTES
ASSESSMENT/PLAN  1. Encounter for well woman exam with routine gynecological exam (Primary)  Routine well woman visit.   Your exam was normal today.   A pap and HPV test was done. If you are signed onto the  MY CHART, you will be notified about your pap results through the MY CHART in 2-3 weeks.      2. Encounter for screening mammogram for breast cancer  Your clinical breast exam was normal.   A screening mammogram order has been placed.   Please schedule your mammogram.     3. Screening for osteoporosis  Bone density requisition sent.   - XR DEXA bone density; Future    4. Vaginal dryness.  Information about Revaree, hyaluronic acid vaginal suppositories.          SUBJECTIVE      PAP 9-24-19 NEG HPV NEG  MAMMO 1-22-24  DEXA NEVER  COLON 8-27-18 NL/ 10 years          HPI    58 yo G0 (1 adopted dtr) for routine well woman exam.  Last seen 12/2023.   Menopausal since age 51. No bleeding.   Notes vaginal dryness, discomfort with intercourse. We reviewed options including intravaginal estrogen, intravaginal hyaluronic vaginal suppositories.   Continues on Buproprion for vaginismus, depression,  Up to date with PCP visits.  Denies any other intervening medical or surgical issues.   , non smoker, rare ETOH.     REVIEW OF SYSTEMS    Constitutional:  no recent weight gain, no fatigue.   ENT: no hearing loss  Cardiovascular: no palpitations.  Respiratory: no shortness of breath  Gastrointestinal: no abdominal pain, no constipation, no nausea, no diarrhea, no vomiting.   Genitourinary: no pelvic pain, no dysuria, no urinary incontinence, no change in urinary frequency, + vaginal dryness, no vaginal itching,  no vaginal discharge, no unexplained vaginal bleeding, no lesion/sore.   Musculoskeletal: no back pain,   Integumentary: no rashes, no skin lesions, no breast pain, no nipple discharge, no breast lump.   Neurological: no headache  Psychiatric: no sleep disturbances, no anxiety, no depression.   Endocrine: no  "hot flashes   Hematologic/Lymphatic: no swollen glands    OBJECTIVE  /78   Ht 1.702 m (5' 7\")   Wt 77.1 kg (170 lb)   BMI 26.63 kg/m²        Physical Exam     Constitutional: Alert and in no acute distress. Well developed, well nourished   Head and Face: Head and face: normal   Eyes: Normal external exam - nonicteric sclera.  Ears, Nose, Mouth, and Throat: External inspection of ears and nose: normal   Neck: no neck asymmetry. Supple and thyroid not enlarged and there were no palpable thyroid nodules   Cardiovascular: Heart rate and rhythm were normal  Pulmonary: No respiratory distress and clear bilateral breath sounds   Chest: Breasts: normal appearance, no nipple discharge, no skin changes. Palpation of breasts and axillae: no palpable mass, no axillary lymphadenopathy   Abdomen: soft nontender; no abdominal mass palpated, no organomegaly and no hernias   Genitourinary: external genitalia: normal appearing vulva and labia without lesions. No inguinal lymphadenopathy,    Urethra: normal.   Bladder: normal on palpation.   Perianal area: normal   Vagina: normal, without significant discharge, no lesions.  Cervix: Normal appearing without lesions. Difficult visualization of cervix, narrowest speculum used.   Uterus: Normal, mobile, nontender. Pelvic exam permits single digit only.   Right and left adnexa/parametria: Normal  Skin: normal skin color and pigmentation, normal skin turgor, and no rash  Psychiatric: alert and oriented x 3., affect normal to patient baseline and mood: appropriate        Jojo Berger MD    "

## 2024-12-10 ENCOUNTER — APPOINTMENT (OUTPATIENT)
Dept: OBSTETRICS AND GYNECOLOGY | Facility: CLINIC | Age: 59
End: 2024-12-10
Payer: COMMERCIAL

## 2024-12-10 VITALS
WEIGHT: 170 LBS | SYSTOLIC BLOOD PRESSURE: 122 MMHG | HEIGHT: 67 IN | BODY MASS INDEX: 26.68 KG/M2 | DIASTOLIC BLOOD PRESSURE: 78 MMHG

## 2024-12-10 DIAGNOSIS — Z12.4 ROUTINE CERVICAL SMEAR: ICD-10-CM

## 2024-12-10 DIAGNOSIS — Z01.419 ENCOUNTER FOR WELL WOMAN EXAM WITH ROUTINE GYNECOLOGICAL EXAM: Primary | ICD-10-CM

## 2024-12-10 DIAGNOSIS — Z78.0 ASYMPTOMATIC POSTMENOPAUSAL STATE: ICD-10-CM

## 2024-12-10 DIAGNOSIS — Z13.820 SCREENING FOR OSTEOPOROSIS: ICD-10-CM

## 2024-12-10 DIAGNOSIS — Z12.31 ENCOUNTER FOR SCREENING MAMMOGRAM FOR BREAST CANCER: ICD-10-CM

## 2024-12-10 PROCEDURE — 1036F TOBACCO NON-USER: CPT | Performed by: OBSTETRICS & GYNECOLOGY

## 2024-12-10 PROCEDURE — 3008F BODY MASS INDEX DOCD: CPT | Performed by: OBSTETRICS & GYNECOLOGY

## 2024-12-10 PROCEDURE — 88142 CYTOPATH C/V THIN LAYER: CPT

## 2024-12-10 PROCEDURE — 87624 HPV HI-RISK TYP POOLED RSLT: CPT

## 2024-12-10 PROCEDURE — 99396 PREV VISIT EST AGE 40-64: CPT | Performed by: OBSTETRICS & GYNECOLOGY

## 2024-12-13 ENCOUNTER — SPECIALTY PHARMACY (OUTPATIENT)
Dept: PHARMACY | Facility: CLINIC | Age: 59
End: 2024-12-13

## 2024-12-16 DIAGNOSIS — E03.9 ADULT HYPOTHYROIDISM: ICD-10-CM

## 2024-12-18 LAB
CYTOLOGY CMNT CVX/VAG CYTO-IMP: NORMAL
HPV HR 12 DNA GENITAL QL NAA+PROBE: NEGATIVE
HPV HR GENOTYPES PNL CVX NAA+PROBE: NEGATIVE
HPV16 DNA SPEC QL NAA+PROBE: NEGATIVE
HPV18 DNA SPEC QL NAA+PROBE: NEGATIVE
LAB AP HPV GENOTYPE QUESTION: YES
LAB AP HPV HR: NORMAL
LABORATORY COMMENT REPORT: NORMAL
LABORATORY COMMENT REPORT: NORMAL
PATH REPORT.TOTAL CANCER: NORMAL

## 2024-12-19 ENCOUNTER — PHARMACY VISIT (OUTPATIENT)
Dept: PHARMACY | Facility: CLINIC | Age: 59
End: 2024-12-19
Payer: COMMERCIAL

## 2024-12-19 DIAGNOSIS — E03.9 ADULT HYPOTHYROIDISM: ICD-10-CM

## 2024-12-19 DIAGNOSIS — F41.9 ANXIETY: ICD-10-CM

## 2024-12-19 PROCEDURE — RXMED WILLOW AMBULATORY MEDICATION CHARGE

## 2024-12-19 RX ORDER — CITALOPRAM 20 MG/1
20 TABLET, FILM COATED ORAL DAILY
Qty: 90 TABLET | Refills: 2 | OUTPATIENT
Start: 2024-12-19 | End: 2025-12-18

## 2024-12-19 RX ORDER — LEVOTHYROXINE SODIUM 75 UG/1
TABLET ORAL
Qty: 65 TABLET | Refills: 2 | OUTPATIENT
Start: 2024-12-19 | End: 2025-12-18

## 2024-12-19 RX ORDER — METHOCARBAMOL 750 MG/1
750-1500 TABLET, FILM COATED ORAL 3 TIMES DAILY PRN
Qty: 30 TABLET | Refills: 0 | OUTPATIENT
Start: 2024-12-19

## 2024-12-21 RX ORDER — LEVOTHYROXINE SODIUM 88 UG/1
TABLET ORAL
Qty: 25 TABLET | Refills: 2 | OUTPATIENT
Start: 2024-12-21 | End: 2025-12-20

## 2024-12-27 ENCOUNTER — HOSPITAL ENCOUNTER (OUTPATIENT)
Dept: RADIOLOGY | Facility: CLINIC | Age: 59
Discharge: HOME | End: 2024-12-27
Payer: COMMERCIAL

## 2024-12-27 DIAGNOSIS — E03.9 ADULT HYPOTHYROIDISM: ICD-10-CM

## 2024-12-27 DIAGNOSIS — Z78.0 ASYMPTOMATIC POSTMENOPAUSAL STATE: ICD-10-CM

## 2024-12-27 DIAGNOSIS — Z13.820 SCREENING FOR OSTEOPOROSIS: ICD-10-CM

## 2024-12-27 PROCEDURE — 77080 DXA BONE DENSITY AXIAL: CPT

## 2024-12-27 PROCEDURE — RXMED WILLOW AMBULATORY MEDICATION CHARGE

## 2024-12-27 RX ORDER — LEVOTHYROXINE SODIUM 88 UG/1
TABLET ORAL
Qty: 25 TABLET | Refills: 2 | OUTPATIENT
Start: 2024-12-27 | End: 2025-12-26

## 2024-12-27 RX ORDER — LEVOTHYROXINE SODIUM 75 UG/1
TABLET ORAL
Qty: 65 TABLET | Refills: 2 | OUTPATIENT
Start: 2024-12-27 | End: 2025-12-26

## 2024-12-30 ENCOUNTER — PHARMACY VISIT (OUTPATIENT)
Dept: PHARMACY | Facility: CLINIC | Age: 59
End: 2024-12-30
Payer: COMMERCIAL

## 2024-12-30 PROCEDURE — RXMED WILLOW AMBULATORY MEDICATION CHARGE

## 2024-12-30 RX ORDER — LEVOTHYROXINE SODIUM 88 UG/1
88 TABLET ORAL 2 TIMES WEEKLY
Qty: 25 TABLET | Refills: 3 | OUTPATIENT
Start: 2024-12-30

## 2024-12-30 RX ORDER — LEVOTHYROXINE SODIUM 75 UG/1
75 TABLET ORAL
Qty: 65 TABLET | Refills: 3 | OUTPATIENT
Start: 2024-12-29

## 2025-01-08 PROCEDURE — RXMED WILLOW AMBULATORY MEDICATION CHARGE

## 2025-01-15 ENCOUNTER — PHARMACY VISIT (OUTPATIENT)
Dept: PHARMACY | Facility: CLINIC | Age: 60
End: 2025-01-15
Payer: COMMERCIAL

## 2025-01-17 ENCOUNTER — SPECIALTY PHARMACY (OUTPATIENT)
Dept: PHARMACY | Facility: CLINIC | Age: 60
End: 2025-01-17

## 2025-02-01 PROCEDURE — RXMED WILLOW AMBULATORY MEDICATION CHARGE

## 2025-02-05 ENCOUNTER — PHARMACY VISIT (OUTPATIENT)
Dept: PHARMACY | Facility: CLINIC | Age: 60
End: 2025-02-05
Payer: COMMERCIAL

## 2025-02-22 PROCEDURE — RXMED WILLOW AMBULATORY MEDICATION CHARGE

## 2025-02-24 ENCOUNTER — PHARMACY VISIT (OUTPATIENT)
Dept: PHARMACY | Facility: CLINIC | Age: 60
End: 2025-02-24
Payer: COMMERCIAL

## 2025-03-01 PROCEDURE — RXMED WILLOW AMBULATORY MEDICATION CHARGE

## 2025-03-10 ENCOUNTER — PHARMACY VISIT (OUTPATIENT)
Dept: PHARMACY | Facility: CLINIC | Age: 60
End: 2025-03-10
Payer: COMMERCIAL

## 2025-03-19 DIAGNOSIS — F32.89 OTHER DEPRESSION: ICD-10-CM

## 2025-03-19 PROCEDURE — RXMED WILLOW AMBULATORY MEDICATION CHARGE

## 2025-03-19 RX ORDER — BUPROPION HYDROCHLORIDE 300 MG/1
300 TABLET ORAL DAILY
Qty: 90 TABLET | Refills: 3 | Status: SHIPPED | OUTPATIENT
Start: 2025-03-19 | End: 2026-03-18

## 2025-03-21 ENCOUNTER — PHARMACY VISIT (OUTPATIENT)
Dept: PHARMACY | Facility: CLINIC | Age: 60
End: 2025-03-21
Payer: COMMERCIAL

## 2025-03-29 PROCEDURE — RXMED WILLOW AMBULATORY MEDICATION CHARGE

## 2025-03-31 ENCOUNTER — PHARMACY VISIT (OUTPATIENT)
Dept: PHARMACY | Facility: CLINIC | Age: 60
End: 2025-03-31
Payer: COMMERCIAL

## 2025-03-31 PROCEDURE — RXMED WILLOW AMBULATORY MEDICATION CHARGE

## 2025-03-31 RX ORDER — AMOXICILLIN AND CLAVULANATE POTASSIUM 875; 125 MG/1; MG/1
TABLET, FILM COATED ORAL
Qty: 20 TABLET | Refills: 0 | OUTPATIENT
Start: 2025-03-31

## 2025-04-01 ENCOUNTER — HOSPITAL ENCOUNTER (OUTPATIENT)
Dept: RADIOLOGY | Facility: HOSPITAL | Age: 60
Discharge: HOME | End: 2025-04-01
Payer: COMMERCIAL

## 2025-04-01 DIAGNOSIS — J31.0 CHRONIC RHINITIS: ICD-10-CM

## 2025-04-01 PROCEDURE — 70486 CT MAXILLOFACIAL W/O DYE: CPT

## 2025-04-02 ENCOUNTER — PHARMACY VISIT (OUTPATIENT)
Dept: PHARMACY | Facility: CLINIC | Age: 60
End: 2025-04-02
Payer: COMMERCIAL

## 2025-04-03 ENCOUNTER — PHARMACY VISIT (OUTPATIENT)
Dept: PHARMACY | Facility: CLINIC | Age: 60
End: 2025-04-03
Payer: COMMERCIAL

## 2025-04-03 PROCEDURE — RXMED WILLOW AMBULATORY MEDICATION CHARGE

## 2025-04-03 RX ORDER — ESTRADIOL 0.1 MG/G
CREAM VAGINAL
Qty: 42.5 G | Refills: 2 | OUTPATIENT
Start: 2025-04-02

## 2025-04-08 ENCOUNTER — HOSPITAL ENCOUNTER (OUTPATIENT)
Dept: RADIOLOGY | Facility: CLINIC | Age: 60
Discharge: HOME | End: 2025-04-08
Payer: COMMERCIAL

## 2025-04-08 VITALS — WEIGHT: 165 LBS | BODY MASS INDEX: 25.84 KG/M2

## 2025-04-08 DIAGNOSIS — Z12.31 ENCOUNTER FOR SCREENING MAMMOGRAM FOR BREAST CANCER: ICD-10-CM

## 2025-04-08 PROCEDURE — 77063 BREAST TOMOSYNTHESIS BI: CPT | Performed by: RADIOLOGY

## 2025-04-08 PROCEDURE — 77067 SCR MAMMO BI INCL CAD: CPT

## 2025-04-08 PROCEDURE — 77067 SCR MAMMO BI INCL CAD: CPT | Performed by: RADIOLOGY

## 2025-04-09 ENCOUNTER — HOSPITAL ENCOUNTER (OUTPATIENT)
Dept: RADIOLOGY | Facility: HOSPITAL | Age: 60
Discharge: HOME | End: 2025-04-09
Payer: COMMERCIAL

## 2025-04-09 DIAGNOSIS — R22.1 NECK FULLNESS: ICD-10-CM

## 2025-04-09 PROCEDURE — 76536 US EXAM OF HEAD AND NECK: CPT

## 2025-04-29 PROCEDURE — RXMED WILLOW AMBULATORY MEDICATION CHARGE

## 2025-05-05 ENCOUNTER — PHARMACY VISIT (OUTPATIENT)
Dept: PHARMACY | Facility: CLINIC | Age: 60
End: 2025-05-05
Payer: COMMERCIAL

## 2025-06-17 PROCEDURE — RXMED WILLOW AMBULATORY MEDICATION CHARGE

## 2025-06-18 ENCOUNTER — PHARMACY VISIT (OUTPATIENT)
Dept: PHARMACY | Facility: CLINIC | Age: 60
End: 2025-06-18
Payer: COMMERCIAL

## 2025-06-18 ENCOUNTER — APPOINTMENT (OUTPATIENT)
Dept: OTOLARYNGOLOGY | Facility: CLINIC | Age: 60
End: 2025-06-18
Payer: COMMERCIAL

## 2025-06-18 VITALS — WEIGHT: 170 LBS | HEIGHT: 67 IN | BODY MASS INDEX: 26.68 KG/M2

## 2025-06-18 DIAGNOSIS — J31.0 CHRONIC RHINITIS: ICD-10-CM

## 2025-06-18 DIAGNOSIS — J34.2 NASAL SEPTAL DEVIATION: ICD-10-CM

## 2025-06-18 DIAGNOSIS — R44.8 FACIAL PRESSURE: ICD-10-CM

## 2025-06-18 DIAGNOSIS — R04.0 EPISTAXIS: Primary | ICD-10-CM

## 2025-06-18 DIAGNOSIS — J34.89 NASAL DRYNESS: ICD-10-CM

## 2025-06-18 PROCEDURE — RXMED WILLOW AMBULATORY MEDICATION CHARGE

## 2025-06-18 PROCEDURE — 3008F BODY MASS INDEX DOCD: CPT | Performed by: OTOLARYNGOLOGY

## 2025-06-18 PROCEDURE — 31231 NASAL ENDOSCOPY DX: CPT | Performed by: OTOLARYNGOLOGY

## 2025-06-18 PROCEDURE — 99204 OFFICE O/P NEW MOD 45 MIN: CPT | Performed by: OTOLARYNGOLOGY

## 2025-06-18 RX ORDER — MUPIROCIN 20 MG/G
1 OINTMENT TOPICAL
Qty: 22 G | Refills: 0 | Status: SHIPPED | OUTPATIENT
Start: 2025-06-18 | End: 2025-07-18

## 2025-06-18 ASSESSMENT — PATIENT HEALTH QUESTIONNAIRE - PHQ9
SUM OF ALL RESPONSES TO PHQ9 QUESTIONS 1 AND 2: 0
1. LITTLE INTEREST OR PLEASURE IN DOING THINGS: NOT AT ALL
2. FEELING DOWN, DEPRESSED OR HOPELESS: NOT AT ALL

## 2025-06-19 NOTE — PROGRESS NOTES
Referring Provider:  Vania Turner MD  [id number]   [address]  [place] [address]      History of Present Illness:  History of Present Illness  The patient presents for evaluation of ongoing sinus issues    Over the past few years, she has experienced a progressive worsening of her sinus-related symptoms, which initially presented as seasonal allergies but have since become a year-round issue. She reports constant congestion, excessive mucus production, and frequent sinus infections. Facial pressure and discomfort also affects her. Her sleep quality is compromised due to these symptoms, necessitating the use of Breathe Right strips. Allergy medications prescribed by Dr. Turner, which she takes at night, have not provided significant relief. She had seen Dr. Cooper in the past.    A CT of her sinuses from 04/2025 revealed a deviated septum but no other significant sinonasal disease. I reviewed her CT with her in depth today. She reports frequent nosebleeds and chronic irritation in her nasal passages. She has attempted to alleviate her symptoms with various ointments, including Brolian, but these have not been effective. She has an upcoming appointment with an allergist and suspects that environmental factors such as dust may be contributing to her symptoms.    SOCIAL HISTORY  She works at  at [employer].     ?  Review of Systems:     Review of symptoms was negative except for those stated including Cardiopulmonary, Genitourinary, Gastrointestinal, Psychological, Sleep pattern, Endocrine, Eyes, Neurologic, Musculoskeletal, Skin, Hematologic/Lymphatic and Allergic/Immunologic.     Medical History:     I have reviewed the patient's updated past medical history, surgical history, family history, social history, as well as current medications and allergies as of 5/6/2025. Changes to these items have been updated and marked as reviewed in the electronic medical record.      Physical Exam:  Physical Exam  General: Patient doing well overall and is in no apparent distress.  Vital signs: Vitals were not taken for this visit.  Psych: Pleasant affect, and answers questions appropriately.  Head & Face: Symmetric facial movements.  Eyes: Pupils equal, round, reactive. Extraocular movements intact without gaze restrictions or nystagmus. No epiphora.  Ears: External auditory canals are normal. Tympanic membranes are clear. No middle ear effusion is seen. All middle ear landmarks are normal.  Nose: Anterior nasal mucosa is very dry. Septum is significantly deviated to the right side.  Oral Cavity/Oropharynx: Without lesions or masses to visual exam.  Neck: Supple without lymphadenopathy.  Lungs: Non-labored, and without evidence of stridor.  Cardiac: Pulses are strong, well-perfused.  Extremities: Without gross evidence of clubbing, cyanosis, or edema.  Neuro: Cranial nerves II-XII grossly intact; Intact facial movements.     Procedure:  Rigid nasal endoscopy (64194)  Pre-procedure diagnosis/Indication for procedure:  To evaluate areas not visualized on anterior rhinoscopy   Anesthesia:  None  Description:  A 0-degree 3-mm rigid nasal endoscope was used to examine the left and right nasal cavities.  The nasal valve areas were examined for abnormalities or collapse.  The inferior and middle turbinates were evaluated.  The middle and superior meatuses, and the sphenoethmoid recesses were examined and inspected for mucopurulence and polyps. Once the endoscope was withdrawn, the patient was noted to have tolerated the procedure well without complications and was returned to ambulatory status.    Findings:    Moderate to severe right nasal septal deviation. Very dry anterior septal mucosa with bloody excoriation noted bilaterally. I gently lifted some of the scab from the right anterior septum and the mucosa was noted to be intact. No middle meatal pathology. Nasopharynx is clear. Positive modified  Pendleton maneuver bilaterally.    Assessment & Plan  1. Chronic Sinusitis.  Symptoms include congestion, excessive mucus production, and facial pain/pressure The CT scan from 04/2025 shows thick mucus in the right maxillary sinus. The nasal septum is significantly deviated to the right, contributing to the symptoms. The nasal valve function was assessed using the modified Eduardo maneuver, indicating an anatomical issue. The crusting in the front of the nose is likely contributing to discomfort and other issues. There may also be an element of vasomotor rhinitis. Advised to continue using nasal strips as they help improve breathing. A prescription for Bactroban (mupirocin) ointment was provided, to be applied 3 times daily for 2 to 3 weeks. If there is improvement, the next phase of treatment will be initiated, which may include ipratropium bromide nasal spray and a topical mometasone rinse for 6 to 8 weeks. If symptoms persist, surgical options such as septoplasty may be considered.    2. Deviated Septum.  The CT scan and nasal examination confirmed a moderate to severe deviation of the nasal septum to the right. This anatomical issue is contributing to the symptoms. Surgical correction (septoplasty) may be considered if medical management does not improve symptoms.    3. Vasomotor Rhinitis.  May have vasomotor rhinitis, contributing to the production of excessive mucus and runny nose. This condition is common in individuals over the age of 45-50. If the initial treatment with Bactroban ointment improves the nasal crusting, a trial of ipratropium bromide nasal spray and a topical mometasone rinse will be initiated for 6 to 8 weeks.    4. Nasal Crusting.  Chronic nasal crusting is contributing to discomfort and potential complications. Advised to avoid picking at the crusts to allow healing. A prescription for Bactroban (mupirocin) ointment was provided, to be applied 3 times daily for 2 to 3 weeks. Instructed on  proper application techniques to keep the area moist and promote healing.    Risks, benefits, and alternatives of treatment were discussed. The patient was informed that some symptoms might be modifiable with medications such as nasal sprays, but the nasal crusting needs to be addressed first. The patient was advised that surgical options like septoplasty could be considered if medical management does not improve symptoms. The importance of proper application of nasal sprays to avoid further complications was emphasized. The patient was also informed about the potential risk of developing a hole in the septum if the crusting continues.    Follow-up  Follow up in 2 weeks.          Evelio Holt MD, M.Eng.   of Otolaryngology - Head & Neck Surgery  Division of Rhinology and Endoscopic Skull Base Surgery  [facility]     This medical note was created with the assistance of artificial intelligence (AI) for documentation purposes. The content has been reviewed and confirmed by the healthcare provider for accuracy and completeness. Patient consented to the use of audio recording and use of AI during their visit.

## 2025-07-02 ENCOUNTER — APPOINTMENT (OUTPATIENT)
Dept: OTOLARYNGOLOGY | Facility: CLINIC | Age: 60
End: 2025-07-02
Payer: COMMERCIAL

## 2025-07-04 PROCEDURE — RXMED WILLOW AMBULATORY MEDICATION CHARGE

## 2025-07-08 ENCOUNTER — PHARMACY VISIT (OUTPATIENT)
Dept: PHARMACY | Facility: CLINIC | Age: 60
End: 2025-07-08
Payer: COMMERCIAL

## 2025-07-14 PROCEDURE — RXMED WILLOW AMBULATORY MEDICATION CHARGE

## 2025-07-17 ENCOUNTER — PHARMACY VISIT (OUTPATIENT)
Dept: PHARMACY | Facility: CLINIC | Age: 60
End: 2025-07-17
Payer: COMMERCIAL

## 2025-07-17 ENCOUNTER — SPECIALTY PHARMACY (OUTPATIENT)
Dept: PHARMACY | Facility: CLINIC | Age: 60
End: 2025-07-17

## 2025-07-22 ENCOUNTER — SPECIALTY PHARMACY (OUTPATIENT)
Dept: PHARMACY | Facility: CLINIC | Age: 60
End: 2025-07-22

## 2025-07-23 ENCOUNTER — APPOINTMENT (OUTPATIENT)
Dept: OTOLARYNGOLOGY | Facility: CLINIC | Age: 60
End: 2025-07-23
Payer: COMMERCIAL

## 2025-07-23 DIAGNOSIS — J34.2 NASAL SEPTAL DEVIATION: ICD-10-CM

## 2025-07-23 DIAGNOSIS — R04.0 EPISTAXIS: ICD-10-CM

## 2025-07-23 DIAGNOSIS — J31.0 CHRONIC RHINITIS: Primary | ICD-10-CM

## 2025-07-23 PROCEDURE — 31231 NASAL ENDOSCOPY DX: CPT | Performed by: OTOLARYNGOLOGY

## 2025-07-23 ASSESSMENT — PATIENT HEALTH QUESTIONNAIRE - PHQ9
2. FEELING DOWN, DEPRESSED OR HOPELESS: NOT AT ALL
SUM OF ALL RESPONSES TO PHQ9 QUESTIONS 1 AND 2: 0
1. LITTLE INTEREST OR PLEASURE IN DOING THINGS: NOT AT ALL

## 2025-07-24 ENCOUNTER — HOSPITAL ENCOUNTER (INPATIENT)
Facility: HOSPITAL | Age: 60
LOS: 2 days | Discharge: HOME | DRG: 417 | End: 2025-07-26
Attending: EMERGENCY MEDICINE | Admitting: INTERNAL MEDICINE
Payer: COMMERCIAL

## 2025-07-24 ENCOUNTER — APPOINTMENT (OUTPATIENT)
Dept: CARDIOLOGY | Facility: HOSPITAL | Age: 60
DRG: 417 | End: 2025-07-24
Payer: COMMERCIAL

## 2025-07-24 ENCOUNTER — APPOINTMENT (OUTPATIENT)
Dept: RADIOLOGY | Facility: HOSPITAL | Age: 60
DRG: 417 | End: 2025-07-24
Payer: COMMERCIAL

## 2025-07-24 DIAGNOSIS — K85.10 GALLSTONE PANCREATITIS (HHS-HCC): Primary | ICD-10-CM

## 2025-07-24 LAB
ALBUMIN SERPL BCP-MCNC: 4 G/DL (ref 3.4–5)
ALBUMIN SERPL BCP-MCNC: 4.1 G/DL (ref 3.4–5)
ALP SERPL-CCNC: 174 U/L (ref 33–110)
ALP SERPL-CCNC: 181 U/L (ref 33–110)
ALT SERPL W P-5'-P-CCNC: 355 U/L (ref 7–45)
ALT SERPL W P-5'-P-CCNC: 540 U/L (ref 7–45)
ANION GAP SERPL CALC-SCNC: 11 MMOL/L (ref 10–20)
ANION GAP SERPL CALC-SCNC: 14 MMOL/L (ref 10–20)
AST SERPL W P-5'-P-CCNC: 127 U/L (ref 9–39)
AST SERPL W P-5'-P-CCNC: 474 U/L (ref 9–39)
BASOPHILS # BLD AUTO: 0.03 X10*3/UL (ref 0–0.1)
BASOPHILS NFR BLD AUTO: 0.4 %
BILIRUB SERPL-MCNC: 0.7 MG/DL (ref 0–1.2)
BILIRUB SERPL-MCNC: 1 MG/DL (ref 0–1.2)
BUN SERPL-MCNC: 11 MG/DL (ref 6–23)
BUN SERPL-MCNC: 14 MG/DL (ref 6–23)
CALCIUM SERPL-MCNC: 9.2 MG/DL (ref 8.6–10.3)
CALCIUM SERPL-MCNC: 9.5 MG/DL (ref 8.6–10.3)
CHLORIDE SERPL-SCNC: 106 MMOL/L (ref 98–107)
CHLORIDE SERPL-SCNC: 107 MMOL/L (ref 98–107)
CO2 SERPL-SCNC: 24 MMOL/L (ref 21–32)
CO2 SERPL-SCNC: 27 MMOL/L (ref 21–32)
CREAT SERPL-MCNC: 0.7 MG/DL (ref 0.5–1.05)
CREAT SERPL-MCNC: 0.77 MG/DL (ref 0.5–1.05)
EGFRCR SERPLBLD CKD-EPI 2021: 90 ML/MIN/1.73M*2
EGFRCR SERPLBLD CKD-EPI 2021: >90 ML/MIN/1.73M*2
EOSINOPHIL # BLD AUTO: 0.06 X10*3/UL (ref 0–0.7)
EOSINOPHIL NFR BLD AUTO: 0.9 %
ERYTHROCYTE [DISTWIDTH] IN BLOOD BY AUTOMATED COUNT: 12.6 % (ref 11.5–14.5)
ERYTHROCYTE [DISTWIDTH] IN BLOOD BY AUTOMATED COUNT: 12.7 % (ref 11.5–14.5)
GLUCOSE SERPL-MCNC: 101 MG/DL (ref 74–99)
GLUCOSE SERPL-MCNC: 101 MG/DL (ref 74–99)
HCT VFR BLD AUTO: 37.6 % (ref 36–46)
HCT VFR BLD AUTO: 42 % (ref 36–46)
HGB BLD-MCNC: 12.8 G/DL (ref 12–16)
HGB BLD-MCNC: 13.9 G/DL (ref 12–16)
IMM GRANULOCYTES # BLD AUTO: 0.02 X10*3/UL (ref 0–0.7)
IMM GRANULOCYTES NFR BLD AUTO: 0.3 % (ref 0–0.9)
LACTATE SERPL-SCNC: 1.4 MMOL/L (ref 0.4–2)
LIPASE SERPL-CCNC: 567 U/L (ref 9–82)
LIPASE SERPL-CCNC: 680 U/L (ref 9–82)
LYMPHOCYTES # BLD AUTO: 1.77 X10*3/UL (ref 1.2–4.8)
LYMPHOCYTES NFR BLD AUTO: 25.2 %
MCH RBC QN AUTO: 31.6 PG (ref 26–34)
MCH RBC QN AUTO: 31.9 PG (ref 26–34)
MCHC RBC AUTO-ENTMCNC: 33.1 G/DL (ref 32–36)
MCHC RBC AUTO-ENTMCNC: 34 G/DL (ref 32–36)
MCV RBC AUTO: 94 FL (ref 80–100)
MCV RBC AUTO: 96 FL (ref 80–100)
MONOCYTES # BLD AUTO: 0.57 X10*3/UL (ref 0.1–1)
MONOCYTES NFR BLD AUTO: 8.1 %
NEUTROPHILS # BLD AUTO: 4.58 X10*3/UL (ref 1.2–7.7)
NEUTROPHILS NFR BLD AUTO: 65.1 %
NRBC BLD-RTO: 0 /100 WBCS (ref 0–0)
NRBC BLD-RTO: 0 /100 WBCS (ref 0–0)
PLATELET # BLD AUTO: 187 X10*3/UL (ref 150–450)
PLATELET # BLD AUTO: 223 X10*3/UL (ref 150–450)
POTASSIUM SERPL-SCNC: 3.9 MMOL/L (ref 3.5–5.3)
POTASSIUM SERPL-SCNC: 4.3 MMOL/L (ref 3.5–5.3)
PROT SERPL-MCNC: 6.2 G/DL (ref 6.4–8.2)
PROT SERPL-MCNC: 6.7 G/DL (ref 6.4–8.2)
RBC # BLD AUTO: 4.01 X10*6/UL (ref 4–5.2)
RBC # BLD AUTO: 4.4 X10*6/UL (ref 4–5.2)
SODIUM SERPL-SCNC: 140 MMOL/L (ref 136–145)
SODIUM SERPL-SCNC: 141 MMOL/L (ref 136–145)
WBC # BLD AUTO: 6.4 X10*3/UL (ref 4.4–11.3)
WBC # BLD AUTO: 7 X10*3/UL (ref 4.4–11.3)

## 2025-07-24 PROCEDURE — 76705 ECHO EXAM OF ABDOMEN: CPT | Performed by: STUDENT IN AN ORGANIZED HEALTH CARE EDUCATION/TRAINING PROGRAM

## 2025-07-24 PROCEDURE — 99285 EMERGENCY DEPT VISIT HI MDM: CPT | Mod: 25 | Performed by: EMERGENCY MEDICINE

## 2025-07-24 PROCEDURE — 36415 COLL VENOUS BLD VENIPUNCTURE: CPT | Performed by: EMERGENCY MEDICINE

## 2025-07-24 PROCEDURE — 74177 CT ABD & PELVIS W/CONTRAST: CPT | Performed by: STUDENT IN AN ORGANIZED HEALTH CARE EDUCATION/TRAINING PROGRAM

## 2025-07-24 PROCEDURE — 96374 THER/PROPH/DIAG INJ IV PUSH: CPT | Mod: 59

## 2025-07-24 PROCEDURE — 83690 ASSAY OF LIPASE: CPT

## 2025-07-24 PROCEDURE — 93005 ELECTROCARDIOGRAM TRACING: CPT

## 2025-07-24 PROCEDURE — 2500000004 HC RX 250 GENERAL PHARMACY W/ HCPCS (ALT 636 FOR OP/ED): Performed by: EMERGENCY MEDICINE

## 2025-07-24 PROCEDURE — 96372 THER/PROPH/DIAG INJ SC/IM: CPT | Performed by: EMERGENCY MEDICINE

## 2025-07-24 PROCEDURE — 2500000004 HC RX 250 GENERAL PHARMACY W/ HCPCS (ALT 636 FOR OP/ED): Performed by: INTERNAL MEDICINE

## 2025-07-24 PROCEDURE — 1200000002 HC GENERAL ROOM WITH TELEMETRY DAILY

## 2025-07-24 PROCEDURE — 99221 1ST HOSP IP/OBS SF/LOW 40: CPT | Performed by: SURGERY

## 2025-07-24 PROCEDURE — 99253 IP/OBS CNSLTJ NEW/EST LOW 45: CPT

## 2025-07-24 PROCEDURE — 2550000001 HC RX 255 CONTRASTS: Performed by: EMERGENCY MEDICINE

## 2025-07-24 PROCEDURE — 96361 HYDRATE IV INFUSION ADD-ON: CPT

## 2025-07-24 PROCEDURE — 2500000001 HC RX 250 WO HCPCS SELF ADMINISTERED DRUGS (ALT 637 FOR MEDICARE OP): Performed by: INTERNAL MEDICINE

## 2025-07-24 PROCEDURE — 80053 COMPREHEN METABOLIC PANEL: CPT

## 2025-07-24 PROCEDURE — 74177 CT ABD & PELVIS W/CONTRAST: CPT

## 2025-07-24 PROCEDURE — 83690 ASSAY OF LIPASE: CPT | Performed by: EMERGENCY MEDICINE

## 2025-07-24 PROCEDURE — 80053 COMPREHEN METABOLIC PANEL: CPT | Performed by: EMERGENCY MEDICINE

## 2025-07-24 PROCEDURE — 76705 ECHO EXAM OF ABDOMEN: CPT

## 2025-07-24 PROCEDURE — 85025 COMPLETE CBC W/AUTO DIFF WBC: CPT | Performed by: EMERGENCY MEDICINE

## 2025-07-24 PROCEDURE — 83605 ASSAY OF LACTIC ACID: CPT | Performed by: EMERGENCY MEDICINE

## 2025-07-24 PROCEDURE — 2500000002 HC RX 250 W HCPCS SELF ADMINISTERED DRUGS (ALT 637 FOR MEDICARE OP, ALT 636 FOR OP/ED): Performed by: INTERNAL MEDICINE

## 2025-07-24 PROCEDURE — 36415 COLL VENOUS BLD VENIPUNCTURE: CPT | Performed by: INTERNAL MEDICINE

## 2025-07-24 PROCEDURE — 85027 COMPLETE CBC AUTOMATED: CPT | Performed by: INTERNAL MEDICINE

## 2025-07-24 RX ORDER — KETOROLAC TROMETHAMINE 30 MG/ML
15 INJECTION, SOLUTION INTRAMUSCULAR; INTRAVENOUS ONCE
Status: COMPLETED | OUTPATIENT
Start: 2025-07-24 | End: 2025-07-24

## 2025-07-24 RX ORDER — LEVOTHYROXINE SODIUM 88 UG/1
88 TABLET ORAL
Status: DISCONTINUED | OUTPATIENT
Start: 2025-07-27 | End: 2025-07-26 | Stop reason: HOSPADM

## 2025-07-24 RX ORDER — ALBUTEROL SULFATE 0.83 MG/ML
3 SOLUTION RESPIRATORY (INHALATION) EVERY 6 HOURS PRN
Status: DISCONTINUED | OUTPATIENT
Start: 2025-07-24 | End: 2025-07-24

## 2025-07-24 RX ORDER — ONDANSETRON HYDROCHLORIDE 2 MG/ML
4 INJECTION, SOLUTION INTRAVENOUS EVERY 8 HOURS PRN
Status: DISCONTINUED | OUTPATIENT
Start: 2025-07-24 | End: 2025-07-26 | Stop reason: HOSPADM

## 2025-07-24 RX ORDER — SODIUM CHLORIDE 9 MG/ML
100 INJECTION, SOLUTION INTRAVENOUS CONTINUOUS
Status: DISCONTINUED | OUTPATIENT
Start: 2025-07-24 | End: 2025-07-24

## 2025-07-24 RX ORDER — FLUTICASONE PROPIONATE 50 MCG
1 SPRAY, SUSPENSION (ML) NASAL DAILY PRN
Status: DISCONTINUED | OUTPATIENT
Start: 2025-07-24 | End: 2025-07-26 | Stop reason: HOSPADM

## 2025-07-24 RX ORDER — LORAZEPAM 0.5 MG/1
0.5 TABLET ORAL DAILY PRN
Status: DISCONTINUED | OUTPATIENT
Start: 2025-07-24 | End: 2025-07-26 | Stop reason: HOSPADM

## 2025-07-24 RX ORDER — LEVOTHYROXINE SODIUM 88 UG/1
88 TABLET ORAL DAILY
Status: DISCONTINUED | OUTPATIENT
Start: 2025-07-24 | End: 2025-07-24

## 2025-07-24 RX ORDER — PANTOPRAZOLE SODIUM 40 MG/10ML
40 INJECTION, POWDER, LYOPHILIZED, FOR SOLUTION INTRAVENOUS
Status: DISCONTINUED | OUTPATIENT
Start: 2025-07-24 | End: 2025-07-26 | Stop reason: HOSPADM

## 2025-07-24 RX ORDER — ALBUTEROL SULFATE 0.83 MG/ML
3 SOLUTION RESPIRATORY (INHALATION) EVERY 2 HOUR PRN
Status: DISCONTINUED | OUTPATIENT
Start: 2025-07-24 | End: 2025-07-26 | Stop reason: HOSPADM

## 2025-07-24 RX ORDER — BUPROPION HYDROCHLORIDE 150 MG/1
300 TABLET ORAL DAILY
Status: DISCONTINUED | OUTPATIENT
Start: 2025-07-24 | End: 2025-07-26 | Stop reason: HOSPADM

## 2025-07-24 RX ORDER — ACETAMINOPHEN 160 MG/5ML
650 SOLUTION ORAL EVERY 4 HOURS PRN
Status: DISCONTINUED | OUTPATIENT
Start: 2025-07-24 | End: 2025-07-26 | Stop reason: HOSPADM

## 2025-07-24 RX ORDER — PANTOPRAZOLE SODIUM 40 MG/1
40 TABLET, DELAYED RELEASE ORAL
Status: DISCONTINUED | OUTPATIENT
Start: 2025-07-24 | End: 2025-07-26 | Stop reason: HOSPADM

## 2025-07-24 RX ORDER — ONDANSETRON 4 MG/1
4 TABLET, FILM COATED ORAL EVERY 8 HOURS PRN
Status: DISCONTINUED | OUTPATIENT
Start: 2025-07-24 | End: 2025-07-26 | Stop reason: HOSPADM

## 2025-07-24 RX ORDER — MONTELUKAST SODIUM 10 MG/1
10 TABLET ORAL DAILY
Status: DISCONTINUED | OUTPATIENT
Start: 2025-07-24 | End: 2025-07-26 | Stop reason: HOSPADM

## 2025-07-24 RX ORDER — ONDANSETRON HYDROCHLORIDE 2 MG/ML
4 INJECTION, SOLUTION INTRAVENOUS ONCE
Status: COMPLETED | OUTPATIENT
Start: 2025-07-24 | End: 2025-07-24

## 2025-07-24 RX ORDER — HYDROMORPHONE HYDROCHLORIDE 0.2 MG/ML
0.2 INJECTION INTRAMUSCULAR; INTRAVENOUS; SUBCUTANEOUS EVERY 4 HOURS PRN
Status: DISCONTINUED | OUTPATIENT
Start: 2025-07-24 | End: 2025-07-26 | Stop reason: HOSPADM

## 2025-07-24 RX ORDER — ENOXAPARIN SODIUM 100 MG/ML
40 INJECTION SUBCUTANEOUS EVERY 24 HOURS
Status: DISCONTINUED | OUTPATIENT
Start: 2025-07-24 | End: 2025-07-26 | Stop reason: HOSPADM

## 2025-07-24 RX ORDER — CITALOPRAM 20 MG/1
20 TABLET ORAL DAILY
Status: DISCONTINUED | OUTPATIENT
Start: 2025-07-24 | End: 2025-07-26 | Stop reason: HOSPADM

## 2025-07-24 RX ORDER — LEVOTHYROXINE SODIUM 75 UG/1
75 TABLET ORAL
Status: DISCONTINUED | OUTPATIENT
Start: 2025-07-24 | End: 2025-07-26 | Stop reason: HOSPADM

## 2025-07-24 RX ORDER — ACETAMINOPHEN 325 MG/1
650 TABLET ORAL EVERY 4 HOURS PRN
Status: DISCONTINUED | OUTPATIENT
Start: 2025-07-24 | End: 2025-07-26 | Stop reason: HOSPADM

## 2025-07-24 RX ORDER — ACETAMINOPHEN 650 MG/1
650 SUPPOSITORY RECTAL EVERY 4 HOURS PRN
Status: DISCONTINUED | OUTPATIENT
Start: 2025-07-24 | End: 2025-07-26 | Stop reason: HOSPADM

## 2025-07-24 RX ADMIN — KETOROLAC TROMETHAMINE 15 MG: 30 INJECTION, SOLUTION INTRAMUSCULAR at 02:24

## 2025-07-24 RX ADMIN — BUPROPION HYDROCHLORIDE 300 MG: 150 TABLET, EXTENDED RELEASE ORAL at 09:03

## 2025-07-24 RX ADMIN — LEVOTHYROXINE SODIUM 75 MCG: 0.07 TABLET ORAL at 05:49

## 2025-07-24 RX ADMIN — SODIUM CHLORIDE, SODIUM LACTATE, POTASSIUM CHLORIDE, AND CALCIUM CHLORIDE 1000 ML: .6; .31; .03; .02 INJECTION, SOLUTION INTRAVENOUS at 02:25

## 2025-07-24 RX ADMIN — CITALOPRAM HYDROBROMIDE 20 MG: 20 TABLET ORAL at 09:03

## 2025-07-24 RX ADMIN — ONDANSETRON 4 MG: 2 INJECTION, SOLUTION INTRAMUSCULAR; INTRAVENOUS at 02:24

## 2025-07-24 RX ADMIN — SODIUM CHLORIDE 100 ML/HR: 9 INJECTION, SOLUTION INTRAVENOUS at 06:18

## 2025-07-24 RX ADMIN — LORAZEPAM 0.5 MG: 0.5 TABLET ORAL at 21:54

## 2025-07-24 RX ADMIN — IOHEXOL 75 ML: 350 INJECTION, SOLUTION INTRAVENOUS at 03:05

## 2025-07-24 SDOH — SOCIAL STABILITY: SOCIAL INSECURITY: DOES ANYONE TRY TO KEEP YOU FROM HAVING/CONTACTING OTHER FRIENDS OR DOING THINGS OUTSIDE YOUR HOME?: NO

## 2025-07-24 SDOH — SOCIAL STABILITY: SOCIAL INSECURITY
WITHIN THE LAST YEAR, HAVE YOU BEEN RAPED OR FORCED TO HAVE ANY KIND OF SEXUAL ACTIVITY BY YOUR PARTNER OR EX-PARTNER?: NO

## 2025-07-24 SDOH — SOCIAL STABILITY: SOCIAL INSECURITY
WITHIN THE LAST YEAR, HAVE YOU BEEN KICKED, HIT, SLAPPED, OR OTHERWISE PHYSICALLY HURT BY YOUR PARTNER OR EX-PARTNER?: NO

## 2025-07-24 SDOH — ECONOMIC STABILITY: FOOD INSECURITY: WITHIN THE PAST 12 MONTHS, THE FOOD YOU BOUGHT JUST DIDN'T LAST AND YOU DIDN'T HAVE MONEY TO GET MORE.: NEVER TRUE

## 2025-07-24 SDOH — ECONOMIC STABILITY: FOOD INSECURITY: WITHIN THE PAST 12 MONTHS, YOU WORRIED THAT YOUR FOOD WOULD RUN OUT BEFORE YOU GOT THE MONEY TO BUY MORE.: NEVER TRUE

## 2025-07-24 SDOH — SOCIAL STABILITY: SOCIAL INSECURITY: DO YOU FEEL UNSAFE GOING BACK TO THE PLACE WHERE YOU ARE LIVING?: NO

## 2025-07-24 SDOH — SOCIAL STABILITY: SOCIAL INSECURITY: WITHIN THE LAST YEAR, HAVE YOU BEEN HUMILIATED OR EMOTIONALLY ABUSED IN OTHER WAYS BY YOUR PARTNER OR EX-PARTNER?: NO

## 2025-07-24 SDOH — ECONOMIC STABILITY: HOUSING INSECURITY: AT ANY TIME IN THE PAST 12 MONTHS, WERE YOU HOMELESS OR LIVING IN A SHELTER (INCLUDING NOW)?: NO

## 2025-07-24 SDOH — ECONOMIC STABILITY: HOUSING INSECURITY: IN THE PAST 12 MONTHS, HOW MANY TIMES HAVE YOU MOVED WHERE YOU WERE LIVING?: 0

## 2025-07-24 SDOH — ECONOMIC STABILITY: FOOD INSECURITY: HOW HARD IS IT FOR YOU TO PAY FOR THE VERY BASICS LIKE FOOD, HOUSING, MEDICAL CARE, AND HEATING?: NOT HARD AT ALL

## 2025-07-24 SDOH — SOCIAL STABILITY: SOCIAL INSECURITY: WITHIN THE LAST YEAR, HAVE YOU BEEN AFRAID OF YOUR PARTNER OR EX-PARTNER?: NO

## 2025-07-24 SDOH — ECONOMIC STABILITY: HOUSING INSECURITY: IN THE LAST 12 MONTHS, WAS THERE A TIME WHEN YOU WERE NOT ABLE TO PAY THE MORTGAGE OR RENT ON TIME?: NO

## 2025-07-24 SDOH — ECONOMIC STABILITY: INCOME INSECURITY: IN THE PAST 12 MONTHS HAS THE ELECTRIC, GAS, OIL, OR WATER COMPANY THREATENED TO SHUT OFF SERVICES IN YOUR HOME?: NO

## 2025-07-24 SDOH — SOCIAL STABILITY: SOCIAL INSECURITY: HAVE YOU HAD THOUGHTS OF HARMING ANYONE ELSE?: NO

## 2025-07-24 SDOH — SOCIAL STABILITY: SOCIAL INSECURITY: HAS ANYONE EVER THREATENED TO HURT YOUR FAMILY OR YOUR PETS?: NO

## 2025-07-24 SDOH — SOCIAL STABILITY: SOCIAL INSECURITY: DO YOU FEEL ANYONE HAS EXPLOITED OR TAKEN ADVANTAGE OF YOU FINANCIALLY OR OF YOUR PERSONAL PROPERTY?: NO

## 2025-07-24 SDOH — ECONOMIC STABILITY: TRANSPORTATION INSECURITY: IN THE PAST 12 MONTHS, HAS LACK OF TRANSPORTATION KEPT YOU FROM MEDICAL APPOINTMENTS OR FROM GETTING MEDICATIONS?: NO

## 2025-07-24 SDOH — SOCIAL STABILITY: SOCIAL INSECURITY: WERE YOU ABLE TO COMPLETE ALL THE BEHAVIORAL HEALTH SCREENINGS?: YES

## 2025-07-24 SDOH — SOCIAL STABILITY: SOCIAL INSECURITY: HAVE YOU HAD ANY THOUGHTS OF HARMING ANYONE ELSE?: NO

## 2025-07-24 SDOH — SOCIAL STABILITY: SOCIAL INSECURITY: ARE THERE ANY APPARENT SIGNS OF INJURIES/BEHAVIORS THAT COULD BE RELATED TO ABUSE/NEGLECT?: NO

## 2025-07-24 SDOH — SOCIAL STABILITY: SOCIAL INSECURITY: ARE YOU OR HAVE YOU BEEN THREATENED OR ABUSED PHYSICALLY, EMOTIONALLY, OR SEXUALLY BY ANYONE?: NO

## 2025-07-24 SDOH — SOCIAL STABILITY: SOCIAL INSECURITY: ABUSE: ADULT

## 2025-07-24 ASSESSMENT — COGNITIVE AND FUNCTIONAL STATUS - GENERAL
MOBILITY SCORE: 24
PATIENT BASELINE BEDBOUND: NO
MOBILITY SCORE: 24
DAILY ACTIVITIY SCORE: 24
MOBILITY SCORE: 24
DAILY ACTIVITIY SCORE: 24
DAILY ACTIVITIY SCORE: 24

## 2025-07-24 ASSESSMENT — ACTIVITIES OF DAILY LIVING (ADL)
PATIENT'S MEMORY ADEQUATE TO SAFELY COMPLETE DAILY ACTIVITIES?: YES
GROOMING: INDEPENDENT
HEARING - LEFT EAR: FUNCTIONAL
JUDGMENT_ADEQUATE_SAFELY_COMPLETE_DAILY_ACTIVITIES: YES
WALKS IN HOME: INDEPENDENT
HEARING - RIGHT EAR: FUNCTIONAL
LACK_OF_TRANSPORTATION: NO
BATHING: INDEPENDENT
TOILETING: INDEPENDENT
FEEDING YOURSELF: INDEPENDENT
DRESSING YOURSELF: INDEPENDENT
ADEQUATE_TO_COMPLETE_ADL: YES

## 2025-07-24 ASSESSMENT — LIFESTYLE VARIABLES
HOW OFTEN DO YOU HAVE A DRINK CONTAINING ALCOHOL: MONTHLY OR LESS
AUDIT-C TOTAL SCORE: 1
SKIP TO QUESTIONS 9-10: 1
HOW MANY STANDARD DRINKS CONTAINING ALCOHOL DO YOU HAVE ON A TYPICAL DAY: 1 OR 2
AUDIT-C TOTAL SCORE: 1
HOW OFTEN DO YOU HAVE 6 OR MORE DRINKS ON ONE OCCASION: NEVER

## 2025-07-24 ASSESSMENT — PATIENT HEALTH QUESTIONNAIRE - PHQ9
SUM OF ALL RESPONSES TO PHQ9 QUESTIONS 1 & 2: 0
2. FEELING DOWN, DEPRESSED OR HOPELESS: NOT AT ALL
1. LITTLE INTEREST OR PLEASURE IN DOING THINGS: NOT AT ALL

## 2025-07-24 ASSESSMENT — ENCOUNTER SYMPTOMS
ABDOMINAL PAIN: 1
ALLERGIC/IMMUNOLOGIC NEGATIVE: 1
NAUSEA: 1
APPETITE CHANGE: 1
NEUROLOGICAL NEGATIVE: 1
EYES NEGATIVE: 1
FATIGUE: 1
MUSCULOSKELETAL NEGATIVE: 1
ENDOCRINE NEGATIVE: 1
HEMATOLOGIC/LYMPHATIC NEGATIVE: 1
CARDIOVASCULAR NEGATIVE: 1
VOMITING: 1
ACTIVITY CHANGE: 1
RESPIRATORY NEGATIVE: 1
PSYCHIATRIC NEGATIVE: 1

## 2025-07-24 ASSESSMENT — PAIN SCALES - GENERAL
PAINLEVEL_OUTOF10: 0 - NO PAIN
PAINLEVEL_OUTOF10: 0 - NO PAIN
PAINLEVEL_OUTOF10: 6
PAINLEVEL_OUTOF10: 6
PAINLEVEL_OUTOF10: 4

## 2025-07-24 ASSESSMENT — PAIN - FUNCTIONAL ASSESSMENT
PAIN_FUNCTIONAL_ASSESSMENT: 0-10

## 2025-07-24 ASSESSMENT — PAIN DESCRIPTION - LOCATION
LOCATION: ABDOMEN
LOCATION: ABDOMEN

## 2025-07-24 NOTE — ED TRIAGE NOTES
Pt to ed w c/o upper abd pain for 1 week. Pt endorses vomiting. Pt states she had one bout of diarrhea this evening. Pt denies cp but endorses sob. Pt states the pain got better after she threw up.

## 2025-07-24 NOTE — CONSULTS
"Patient is a 60-year-old female who comes in with 1 week complaint of some episodic right upper quadrant abdominal pain.  Over the past several days her symptoms have intensified.  She has had some nausea and yesterday vomiting.  Also some chills.  She thought that she was suffering from gas and tried to self treat with yoga poses.    Workup includes a right upper quadrant ultrasound and a CT scan that shows gallstones with evidence of acute cholecystitis.  Noted to have mildly elevated lipase.  She feels much better at this point    PMH: GERD, asthma, depression, hypothyroidism    PSH: Diagnostic laparoscopy    Does not smoke, rarely drinks alcohol    Exam, comfortable  /60 (BP Location: Left arm, Patient Position: Lying)   Pulse 65   Temp 36.2 °C (97.2 °F) (Temporal)   Resp 18   Ht 1.702 m (5' 7\")   Wt 72.6 kg (160 lb)   LMP  (LMP Unknown)   SpO2 98%   BMI 25.06 kg/m²   Abdomen soft, minimal tenderness, nondistended      Lab Results   Component Value Date    WBC 6.4 07/24/2025    HGB 12.8 07/24/2025    HCT 37.6 07/24/2025    MCV 94 07/24/2025     07/24/2025     Lab Results   Component Value Date    GLUCOSE 101 (H) 07/24/2025     07/24/2025    K 4.3 07/24/2025     07/24/2025    CO2 24 07/24/2025    ANIONGAP 14 07/24/2025    BUN 11 07/24/2025    CREATININE 0.70 07/24/2025    EGFR >90 07/24/2025    CALCIUM 9.2 07/24/2025    ALBUMIN 4.0 07/24/2025    ALKPHOS 181 (H) 07/24/2025    PROT 6.2 (L) 07/24/2025     (H) 07/24/2025    BILITOT 1.0 07/24/2025     (H) 07/24/2025   Lipase 567 -> 680    === 07/24/25 ===    CT ABDOMEN PELVIS W IV CONTRAST    - Impression -  1.  Cholelithiasis and mild gallbladder wall thickening better seen  on concurrent right upper quadrant ultrasound. This finding is  nonspecific there is no other evidence of acute abnormality in the  abdomen/pelvis. Elevated pancreatic enzymes without corresponding  abnormality on the CT scan. Early pancreatitis is " not always detected  on the CT scan.       === 07/24/25 ===    US GALLBLADDER    - Impression -  Cholelithiasis with mild nonspecific gallbladder wall thickening.  Sonographic Cole's sign is not elicited. Correlation with liver  enzymes is recommended. No biliary dilatation.           Impression: Cholelithiasis, mild acute cholecystitis, gallstone pancreatitis    Agree with bowel rest, IV antibiotics.    Planning to take to surgery tomorrow for laparoscopic cholecystectomy with a cholangiogram.    We discussed the procedure in detail she would agree to the operation

## 2025-07-24 NOTE — CARE PLAN
The patient's goals for the shift include to be free of pain.    The clinical goals for the shift include pain management        Problem: Pain - Adult  Goal: Verbalizes/displays adequate comfort level or baseline comfort level  Outcome: Progressing

## 2025-07-24 NOTE — ED PROVIDER NOTES
Emergency Department Provider Note       History of Present Illness     History provided by: Patient  Limitations to History: None  External Records Reviewed with Brief Summary: Outpatient progress note from PCP Dr. Turner which showed hx hypothyroid, GERD, atopic dermatitis    HPI:  Chery Long is a 60 y.o. female hypothyroid who presents with abd pain - reports 3 episodes in the past week or so - initially had 45min of R sided abd pain, severe enough to cause diaphoresis, but resolved in less than 1 hour.  Had another episode after eating pretzels and hummus that lasted for 6hr and was associated with an episode of NB diarrhea.  Tonight, she had ice cream after dinner (did take her usual Lactaid) and severe R sided abd pain recurred.  Pt vomited 4 times tonight en route to the ED.  No fever.  No hx abd surgeries.    Physical Exam   Triage vitals:  T 36.5 °C (97.7 °F)  HR 76  /61  RR 16  O2 97 % None (Room air)    General: Awake, alert, in no acute distress  Eyes: Gaze conjugate.  No scleral icterus or injection  HENT: Normo-cephalic, atraumatic. No stridor  CV: Regular rate, regular rhythm  Resp: Breathing non-labored, speaking in full sentences.  Clear to auscultation bilaterally  GI: Soft, non-distended, mild RUQ tenderness, no rebound or guarding.  MSK/Extremities: No gross bony deformities. Moving all extremities  Skin: Warm. Appropriate color  Neuro: Alert. Oriented. Face symmetric. Speech is fluent.  Gross strength and sensation intact in b/l UE and LEs  Psych: Appropriate mood and affect      Medical Decision Making & ED Course   Medical Decision Makin y.o. female presents with right-sided abdominal pain and vomiting after meal with several recent episodes of similar pain.  Patient nontoxic-appearing, hemodynamically stable and abdomen soft/benign.  Labs with new transaminitis and elevated lipase concerning for gallstone pancreatitis.  Ultrasound and CT confirm cholelithiasis  without clear cholecystitis and without sig dilated CBD.  Surgery [facility]ORGI consulted.  Discussed with hospitalist Dr. Chan for admission.  ----      Differential diagnoses considered include but are not limited to: biliary colic, cholecystitis, cholangitis, enterocolitis    Social Determinants of Health which Significantly Impact Care: Social Determinants of Health which Significantly Impact Care: None identified     EKG Independent Interpretation: EKG interpreted by myself. Please see ED Course for full interpretation.    Independent Result Review and Interpretation: Relevant laboratory and radiographic results were reviewed and independently interpreted by myself.  As necessary, they are commented on in the ED Course.    Chronic conditions affecting the patient's care: None    The patient was discussed with the following consultants/services: see [facility]    Care Considerations: None    ED Course:  ED Course as of 07/24/25 0344   Thu Jul 24, 2025 0218 EKG obtained given abd pain and independently interpreted by me showing Nsr normal axis normal intervals no ischemic changes no prior available for comparison [EK]      ED Course User Index  [EK] Elyse H Klerman, MD         Diagnoses as of 07/24/25 0344   Gallstone pancreatitis ([facility])       Disposition   As a result of their workup, the patient will require admission to the hospital.  The patient was informed of her diagnosis.  The patient was given the opportunity to ask questions and I answered them. The patient agreed to be admitted to the hospital.    Procedures   Procedures        Elyse H Klerman, MD  Emergency Medicine                                                       Elyse H Klerman, MD  07/24/25 0346

## 2025-07-24 NOTE — H&P
History Of Present Illness  Chery Long is a 60 y.o. female with a past medical history of anxiety, asthma, depression, GERD and hypothyroidism who presented to [facility] ER complaining of abdominal pain.  She endorses 3 episodes of abdominal pain and nausea over the past week.  Each episode was right-sided and lasted about 45 minutes.  She was diaphoretic but it resolved in about 45 minutes to 1 hour.  She had another episode after eating pretzels and homeless that lasted about 6 hours and was associated with diarrhea.  Last evening she had ice cream after dinner and took Lactaid as usual and developed right-sided abdominal pain nausea and vomiting.  She denies any fever chills.  No chest pain or shortness of breath  Laboratory evaluation showed a glucose of 101 sodium 141 potassium 3.9 chloride 107 bicarbonate 27 anion gap 11.  BUN 14 creatinine 0.77 calcium 9.5 alkaline phosphatase was 174   total bili 0.7 lactate 1.4 lipase 587.  She is being admitted for gallstone pancreatitis.  CT scan of the abdomen showed cholelithiasis and mild gallbladder wall thickening better seen on current right upper quadrant ultrasound.  This finding is nonspecific and there is no evidence of acute abnormality in the abdomen or pelvis.  Elevated pancreatic enzymes without corresponding abnormality on CT scan can represent early pancreatitis.     Past Medical History  Anxiety  Asthma  Depression  GERD  Dermatitis  Dysfunctional uterine bleeding  Hypothyroidism  Sinusitis    Surgical History  Exploratory laparoscopic surgery with lysis of adhesions 3/31/2018    Social History  She reports that she has never smoked. She has never used smokeless tobacco. She reports current alcohol use. She reports that she does not use drugs.    Family History  Mother had melanoma     Allergies  Grass pollen, Lactose, and Other    Review of Systems   Constitutional:  Positive for activity change, appetite change and  "fatigue.   HENT: Negative.     Eyes: Negative.    Respiratory: Negative.     Cardiovascular: Negative.    Gastrointestinal:  Positive for abdominal pain, nausea and vomiting.   Endocrine: Negative.    Genitourinary: Negative.    Musculoskeletal: Negative.    Skin: Negative.    Allergic/Immunologic: Negative.    Neurological: Negative.    Hematological: Negative.    Psychiatric/Behavioral: Negative.     All other systems reviewed and are negative.       Physical Exam  Vitals and nursing note reviewed.   Constitutional:       Appearance: Normal appearance.   HENT:      Head: Normocephalic.      Right Ear: External ear normal.      Left Ear: External ear normal.      Nose: Nose normal.      Mouth/Throat:      Mouth: Mucous membranes are dry.      Pharynx: Oropharynx is clear.     Eyes:      Extraocular Movements: Extraocular movements intact.      Conjunctiva/sclera: Conjunctivae normal.      Pupils: Pupils are equal, round, and reactive to light.       Cardiovascular:      Rate and Rhythm: Normal rate and regular rhythm.   Pulmonary:      Effort: Pulmonary effort is normal.      Breath sounds: Normal breath sounds.   Abdominal:      General: Abdomen is flat. Bowel sounds are normal.      Palpations: Abdomen is soft.      Tenderness: There is abdominal tenderness.     Musculoskeletal:         General: Normal range of motion.     Skin:     General: Skin is warm and dry.     Neurological:      General: No focal deficit present.      Mental Status: She is alert. Mental status is at baseline.     Psychiatric:         Mood and Affect: Mood normal.         Behavior: Behavior normal.          Last Recorded Vitals  Blood pressure 110/61, pulse 76, temperature 36.5 °C (97.7 °F), temperature source Temporal, resp. rate 16, height 1.702 m (5' 7\"), weight 72.6 kg (160 lb), SpO2 97%, not currently breastfeeding.    Relevant Results  Meds:  Scheduled medications  Scheduled Medications[1]  Continuous medications  Continuous " Medications[2]  PRN medications  PRN Medications[3]   Current Outpatient Medications   Medication Instructions    albuterol 90 mcg/actuation inhaler Inhale.    albuterol 90 mcg/actuation inhaler Inhale 1-2 puffs every 4 hours as needed for shortness of breath    amoxicillin-pot clavulanate (Augmentin) 875-125 mg tablet Take 1 tablet by mouth 2 times a day, for 7 days.    amoxicillin-pot clavulanate (Augmentin) 875-125 mg tablet Take 1 tablet by mouth twice daily with food for 10 days    buPROPion XL (Wellbutrin XL) 300 mg 24 hr tablet TAKE 1 TABLET BY MOUTH ONCE DAILY    citalopram (CeleXA) 20 mg tablet TAKE 1 TABLET BY MOUTH EVERY DAY    citalopram (CeleXA) 20 mg tablet Take 1 tablet by mouth daily    clobetasol (Temovate) 0.05 % cream Apply twice daily to affected areas    dupilumab (Dupixent Pen) 300 mg/2 mL pen injector inject one 300 mg pen under the skin every 14 days.    dupilumab (DUPIXENT SYRINGE SUBQ) subcutaneous    estradiol (Estrace) 0.01 % (0.1 mg/gram) vaginal cream Apply 1 applicatorful vaginally 3 times weekly    flu vacc [id number] 6mos up, PF, (Flulaval Quad [id number], PF,) syringe intramuscular    fluconazole (DIFLUCAN) 150 mg, oral, Daily    fluticasone (Flonase Allergy Relief) 50 mcg/actuation nasal spray Administer 1 spray into each nostril once daily    levocetirizine (Xyzal) 5 mg tablet Take 1 tablet (5 mg) by mouth once daily at bedtime.    levothyroxine (Synthroid, Levoxyl) 75 mcg tablet TAKE 1 TABLET DAILY IN THE MORNING ON AN EMPTY STOMACH, 5 DAYS A WEEK    levothyroxine (Synthroid, Levoxyl) 75 mcg tablet Take 1 tablet (75 mcg) by mouth 5 days a week.    levothyroxine (Synthroid, Levoxyl) 88 mcg tablet TAKE 1 TABLET BY MOUTH ONCE DAILY 2 DAYS PER WEEK    levothyroxine (Synthroid, Levoxyl) 88 mcg tablet Take 1 tablet (88 mcg) by mouth 2 days a week.    LORazepam (Ativan) 0.5 mg tablet Take 1 tablet (0.5 mg) by mouth once daily as needed for anxiety    methocarbamol (Robaxin) 750 mg tablet  Take 1-2 tablets (750-1,500 mg) by mouth 3 times a day as needed for back pain    montelukast (Singulair) 10 mg tablet Take 1 tablet (10 mg) by mouth once daily at bedtime.    nemolizumab-ilto (Nemluvio) 30 mg pen injector Inject 30mg under the skin every 4 weeks.    nemolizumab-ilto (Nemluvio) 30 mg pen injector Inject 30 mg subcutaneously every 4 weeks for maintenance.    omeprazole (PriLOSEC) 40 mg DR capsule Take 1 capsule by mouth every morning    omeprazole (PriLOSEC) 40 mg DR capsule Take 1 cap by mouth once a day in the morning    Opzelura 1.5 % cream cream     semaglutide (OZEMPIC SUBQ) Inject under the skin.    tretinoin (Retin-A) 0.025 % cream Apply pea sized amount to face once a day at night.        Labs:  Results for orders placed or performed during the hospital encounter of 07/24/25 (from the past 24 hours)   CBC and Auto Differential   Result Value Ref Range    WBC 7.0 4.4 - 11.3 x10*3/uL    nRBC 0.0 0.0 - 0.0 /100 WBCs    RBC 4.40 4.00 - 5.20 x10*6/uL    Hemoglobin 13.9 12.0 - 16.0 g/dL    Hematocrit 42.0 36.0 - 46.0 %    MCV 96 80 - 100 fL    MCH 31.6 26.0 - 34.0 pg    MCHC 33.1 32.0 - 36.0 g/dL    RDW 12.7 11.5 - 14.5 %    Platelets 223 150 - 450 x10*3/uL    Neutrophils % 65.1 40.0 - 80.0 %    Immature Granulocytes %, Automated 0.3 0.0 - 0.9 %    Lymphocytes % 25.2 13.0 - 44.0 %    Monocytes % 8.1 2.0 - 10.0 %    Eosinophils % 0.9 0.0 - 6.0 %    Basophils % 0.4 0.0 - 2.0 %    Neutrophils Absolute 4.58 1.20 - 7.70 x10*3/uL    Immature Granulocytes Absolute, Automated 0.02 0.00 - 0.70 x10*3/uL    Lymphocytes Absolute 1.77 1.20 - 4.80 x10*3/uL    Monocytes Absolute 0.57 0.10 - 1.00 x10*3/uL    Eosinophils Absolute 0.06 0.00 - 0.70 x10*3/uL    Basophils Absolute 0.03 0.00 - 0.10 x10*3/uL   Comprehensive metabolic panel   Result Value Ref Range    Glucose 101 (H) 74 - 99 mg/dL    Sodium 141 136 - 145 mmol/L    Potassium 3.9 3.5 - 5.3 mmol/L    Chloride 107 98 - 107 mmol/L    Bicarbonate 27 21 - 32  mmol/L    Anion Gap 11 10 - 20 mmol/L    Urea Nitrogen 14 6 - 23 mg/dL    Creatinine 0.77 0.50 - 1.05 mg/dL    eGFR 90 >60 mL/min/1.73m*2    Calcium 9.5 8.6 - 10.3 mg/dL    Albumin 4.1 3.4 - 5.0 g/dL    Alkaline Phosphatase 174 (H) 33 - 110 U/L    Total Protein 6.7 6.4 - 8.2 g/dL     (H) 9 - 39 U/L    Bilirubin, Total 0.7 0.0 - 1.2 mg/dL     (H) 7 - 45 U/L   Lactate   Result Value Ref Range    Lactate 1.4 0.4 - 2.0 mmol/L   Lipase   Result Value Ref Range    Lipase 567 (H) 9 - 82 U/L      Imaging:  Imaging  CT abdomen pelvis w IV contrast  Result Date: 7/24/2025  1.  Cholelithiasis and mild gallbladder wall thickening better seen on concurrent right upper quadrant ultrasound. This finding is nonspecific there is no other evidence of acute abnormality in the abdomen/pelvis. Elevated pancreatic enzymes without corresponding abnormality on the CT scan. Early pancreatitis is not always detected on the CT scan.     Signed by: Kenrick Duckworth 7/24/2025 3:19 AM Dictation workstation:   [id number]    US gallbladder  Result Date: 7/24/2025  Cholelithiasis with mild nonspecific gallbladder wall thickening. Sonographic Cole's sign is not elicited. Correlation with liver enzymes is recommended. No biliary dilatation.   Signed by: Kenrick Duckworth 7/24/2025 3:11 AM Dictation workstation:   [id number]      Cardiology, Vascular, and Other Imaging  No other imaging results found for the past 2 days       Assessment/Plan   Gallstone pancreatitis  Plan:  Bowel rest  IV hydration  Pain control  Nausea control  General Surgery and GI    Hypothyroidism  Plan:  Continue levothyroxine home dose    Asthma  Plan:  Continue home bronchodilator    DVT prophylaxis  Plan:  Lovenox 40 mg subcutaneous daily  SCDs    I spent 60 minutes in the professional and overall care of this patient.      Spencer Chan DO                       [1] buPROPion XL, 300 mg, oral, Daily  citalopram, 20 mg, oral, Daily  enoxaparin, 40 mg,  subcutaneous, q24h  levothyroxine, 88 mcg, oral, Daily  montelukast, 10 mg, oral, Daily  pantoprazole, 40 mg, oral, Daily before breakfast   Or  pantoprazole, 40 mg, intravenous, Daily before breakfast    [2] sodium chloride 0.9%, 100 mL/hr    [3] PRN medications: acetaminophen **OR** acetaminophen **OR** acetaminophen, albuterol, fluticasone, LORazepam, ondansetron **OR** ondansetron

## 2025-07-24 NOTE — CARE PLAN
The patient's goals for the shift include      The clinical goals for the shift include pain management      Problem: Pain - Adult  Goal: Verbalizes/displays adequate comfort level or baseline comfort level  Outcome: Progressing     Problem: Safety - Adult  Goal: Free from fall injury  Outcome: Progressing     Problem: Discharge Planning  Goal: Discharge to home or other facility with appropriate resources  Outcome: Progressing     Problem: Chronic Conditions and Co-morbidities  Goal: Patient's chronic conditions and co-morbidity symptoms are monitored and maintained or improved  Outcome: Progressing     Problem: Nutrition  Goal: Nutrient intake appropriate for maintaining nutritional needs  Outcome: Progressing     Problem: Pain  Goal: Takes deep breaths with improved pain control throughout the shift  Outcome: Progressing  Goal: Turns in bed with improved pain control throughout the shift  Outcome: Progressing  Goal: Walks with improved pain control throughout the shift  Outcome: Progressing  Goal: Performs ADL's with improved pain control throughout shift  Outcome: Progressing  Goal: Participates in PT with improved pain control throughout the shift  Outcome: Progressing  Goal: Free from opioid side effects throughout the shift  Outcome: Progressing  Goal: Free from acute confusion related to pain meds throughout the shift  Outcome: Progressing

## 2025-07-24 NOTE — CONSULTS
Reason For Consult  Gallstone pancreatitis    History Of Present Illness  Chery Long is a 60 y.o. female with a PMHx s/f anxiety, asthma, depression, GERD and hypothyroidism who presented to [facility] ER on 7/24/25 complaining of abdominal pain associated with nausea and vomiting.  Patient reports that she had 3 episodes of severe intermittent abdominal pain associated with nausea and vomiting that started last Thursday.  Denies fevers but endorses chills.  Also denies tobacco products, marijuana, street drugs, NSAIDs, and antithrombotics.  She drinks occasionally alcohol and takes daily omeprazole for acid reflux.  Denies abdominal pain today.  ER labs are notable for alk phos 174-> 181, -> 540, -> 474,  bili total 0.7->1, lipase 567-> 680, WBC 7, hemoglobin 13.9, platelets 223.    7/24/2025 CT abdomen pelvis w IV contrast  IMPRESSION:  1. Cholelithiasis and mild gallbladder wall thickening better seen on concurrent right upper quadrant ultrasound. This finding is nonspecific there is no other evidence of acute abnormality in the abdomen/pelvis. Elevated pancreatic enzymes without corresponding abnormality on the CT scan. Early pancreatitis is not always detected on the CT scan.    7/24/2025 US gallbladder  IMPRESSION:  Cholelithiasis with mild nonspecific gallbladder wall thickening. Sonographic Cole's sign is not elicited. Correlation with liver enzymes is recommended. No biliary dilatation.    8/27/2018 Colonoscopy with Dr. Bender   - Nonbleeding internal hemorrhoids.  - Mild diverticulosis in the sigmoid colon.  There was no evidence of diverticular bleed.  - Examination was otherwise normal.  - The cecum, superficial, ileocecal valve and terminal ileum were normal.  - Examination was otherwise normal.  - No specimen collected.  - Repeat colonoscopy in 10 years.    GI service was consulted for gallstone pancreatitis.     Past Medical History  She has a past medical history of  Anxiety, Asthma, Depression, Disease of thyroid gland, GERD (gastroesophageal reflux disease), Personal history of diseases of the skin and subcutaneous tissue, Personal history of other diseases of the female genital tract (06/24/2014), Personal history of other diseases of the female genital tract (04/07/2015), Personal history of other endocrine, nutritional and metabolic disease (03/31/2018), Personal history of other mental and behavioral disorders, and Sinusitis.    Surgical History  She has a past surgical history that includes Laparoscopy diagnostic / biopsy / aspiration / lysis (03/31/2018).     Social History  She reports that she has never smoked. She has never used smokeless tobacco. She reports current alcohol use. She reports that she does not use drugs.    Family History  Family History[1]     Allergies  Grass pollen, Lactose, and Other    Review of Systems  Review of Systems   Gastrointestinal:  Positive for abdominal pain, nausea and vomiting.   All other systems reviewed and are negative.        Physical Exam  Physical Exam  Vitals reviewed.   Constitutional:       Appearance: She is obese.   HENT:      Head: Atraumatic.     Cardiovascular:      Rate and Rhythm: Regular rhythm.      Heart sounds: Normal heart sounds.   Pulmonary:      Effort: Pulmonary effort is normal.      Breath sounds: Normal breath sounds.   Abdominal:      General: There is no distension.      Palpations: Abdomen is soft.      Tenderness: There is no abdominal tenderness. There is no guarding or rebound.     Musculoskeletal:         General: Normal range of motion.      Cervical back: Neck supple.     Skin:     General: Skin is warm and dry.     Neurological:      General: No focal deficit present.      Mental Status: She is alert and oriented to person, place, and time.     Psychiatric:         Mood and Affect: Mood normal.         Behavior: Behavior normal.           Last Recorded Vitals  Blood pressure (!) 102/42, pulse  "64, temperature 35.7 °C (96.3 °F), temperature source Temporal, resp. rate 17, height 1.702 m (5' 7\"), weight 72.6 kg (160 lb), SpO2 97%, not currently breastfeeding.    Relevant Results    Scheduled medications  Scheduled Medications[2]  Continuous medications  Continuous Medications[3]  PRN medications  PRN Medications[4]      Results for orders placed or performed during the hospital encounter of 07/24/25 (from the past 24 hours)   CBC and Auto Differential   Result Value Ref Range    WBC 7.0 4.4 - 11.3 x10*3/uL    nRBC 0.0 0.0 - 0.0 /100 WBCs    RBC 4.40 4.00 - 5.20 x10*6/uL    Hemoglobin 13.9 12.0 - 16.0 g/dL    Hematocrit 42.0 36.0 - 46.0 %    MCV 96 80 - 100 fL    MCH 31.6 26.0 - 34.0 pg    MCHC 33.1 32.0 - 36.0 g/dL    RDW 12.7 11.5 - 14.5 %    Platelets 223 150 - 450 x10*3/uL    Neutrophils % 65.1 40.0 - 80.0 %    Immature Granulocytes %, Automated 0.3 0.0 - 0.9 %    Lymphocytes % 25.2 13.0 - 44.0 %    Monocytes % 8.1 2.0 - 10.0 %    Eosinophils % 0.9 0.0 - 6.0 %    Basophils % 0.4 0.0 - 2.0 %    Neutrophils Absolute 4.58 1.20 - 7.70 x10*3/uL    Immature Granulocytes Absolute, Automated 0.02 0.00 - 0.70 x10*3/uL    Lymphocytes Absolute 1.77 1.20 - 4.80 x10*3/uL    Monocytes Absolute 0.57 0.10 - 1.00 x10*3/uL    Eosinophils Absolute 0.06 0.00 - 0.70 x10*3/uL    Basophils Absolute 0.03 0.00 - 0.10 x10*3/uL   Comprehensive metabolic panel   Result Value Ref Range    Glucose 101 (H) 74 - 99 mg/dL    Sodium 141 136 - 145 mmol/L    Potassium 3.9 3.5 - 5.3 mmol/L    Chloride 107 98 - 107 mmol/L    Bicarbonate 27 21 - 32 mmol/L    Anion Gap 11 10 - 20 mmol/L    Urea Nitrogen 14 6 - 23 mg/dL    Creatinine 0.77 0.50 - 1.05 mg/dL    eGFR 90 >60 mL/min/1.73m*2    Calcium 9.5 8.6 - 10.3 mg/dL    Albumin 4.1 3.4 - 5.0 g/dL    Alkaline Phosphatase 174 (H) 33 - 110 U/L    Total Protein 6.7 6.4 - 8.2 g/dL     (H) 9 - 39 U/L    Bilirubin, Total 0.7 0.0 - 1.2 mg/dL     (H) 7 - 45 U/L   Lactate   Result Value Ref " Range    Lactate 1.4 0.4 - 2.0 mmol/L   Lipase   Result Value Ref Range    Lipase 567 (H) 9 - 82 U/L   CBC   Result Value Ref Range    WBC 6.4 4.4 - 11.3 x10*3/uL    nRBC 0.0 0.0 - 0.0 /100 WBCs    RBC 4.01 4.00 - 5.20 x10*6/uL    Hemoglobin 12.8 12.0 - 16.0 g/dL    Hematocrit 37.6 36.0 - 46.0 %    MCV 94 80 - 100 fL    MCH 31.9 26.0 - 34.0 pg    MCHC 34.0 32.0 - 36.0 g/dL    RDW 12.6 11.5 - 14.5 %    Platelets 187 150 - 450 x10*3/uL   Comprehensive Metabolic Panel   Result Value Ref Range    Glucose 101 (H) 74 - 99 mg/dL    Sodium 140 136 - 145 mmol/L    Potassium 4.3 3.5 - 5.3 mmol/L    Chloride 106 98 - 107 mmol/L    Bicarbonate 24 21 - 32 mmol/L    Anion Gap 14 10 - 20 mmol/L    Urea Nitrogen 11 6 - 23 mg/dL    Creatinine 0.70 0.50 - 1.05 mg/dL    eGFR >90 >60 mL/min/1.73m*2    Calcium 9.2 8.6 - 10.3 mg/dL    Albumin 4.0 3.4 - 5.0 g/dL    Alkaline Phosphatase 181 (H) 33 - 110 U/L    Total Protein 6.2 (L) 6.4 - 8.2 g/dL     (H) 9 - 39 U/L    Bilirubin, Total 1.0 0.0 - 1.2 mg/dL     (H) 7 - 45 U/L   Lipase   Result Value Ref Range    Lipase 680 (H) 9 - 82 U/L    CT abdomen pelvis w IV contrast  Result Date: 7/24/2025  Interpreted By:  Kenrick Duckworth, STUDY: CT ABDOMEN PELVIS W IV CONTRAST;  7/24/2025 3:11 am   INDICATION: Signs/Symptoms:R sided abd pain, n/v.   COMPARISON: None.   ACCESSION NUMBER(S): [id number]   ORDERING CLINICIAN: ELYSE KLERMAN   TECHNIQUE: CT of the abdomen and pelvis was performed after administration of intravenous contrast. Standard contiguous axial images were obtained through the abdomen and pelvis. Coronal and sagittal reconstructions were performed.   FINDINGS: LOWER CHEST: No acute abnormality of the lung bases. BONES: No acute osseous abnormality. There is osteopenia. ABDOMINAL WALL: Within normal limits. LYMPH NODES: No pathologically enlarged lymph nodes in the abdomen or pelvis.   ABDOMEN:   LIVER: Normal size and contour. 1.3 cm hypodense lesion in the inferior  right hepatic lobe nonspecific, likely cyst or hemangioma. BILE DUCTS: Normal caliber. GALLBLADDER: Small gallbladder calculi and mild gallbladder wall thickening better evaluate on same day right upper quadrant ultrasound. PANCREAS: No evidence of pancreatic duct dilatation or peripancreatic edema. SPLEEN: Within normal limits. ADRENALS: Within normal limits. KIDNEYS and URETERS: No evidence of hydronephrosis or nephrolithiasis. Normal size and enhancement pattern.     VESSELS: No abdominal aortic aneurysm. Mesenteric vessels appear patent.   RETROPERITONEUM: No evidence of retroperitoneal hematoma.   PELVIS:   REPRODUCTIVE ORGANS: No pelvic masses. BLADDER: No gross abnormality.   BOWEL: Stomach is incompletely distended limiting assessment for wall thickening. No bowel dilatation or obstruction. Appendix not visualized without secondary signs of appendicitis. Formed stool throughout the colon without wall thickening or acute inflammatory change. Mild sigmoid diverticulosis without diverticulitis. PERITONEUM: No ascites or free air, no fluid collection.       1.  Cholelithiasis and mild gallbladder wall thickening better seen on concurrent right upper quadrant ultrasound. This finding is nonspecific there is no other evidence of acute abnormality in the abdomen/pelvis. Elevated pancreatic enzymes without corresponding abnormality on the CT scan. Early pancreatitis is not always detected on the CT scan.     Signed by: Kenrick Duckworth 7/24/2025 3:19 AM Dictation workstation:   [id number]    US gallbladder  Result Date: 7/24/2025  Interpreted By:  Kenrick Duckworth, STUDY: No  US GALLBLADDER;  7/24/2025 3:02 am   INDICATION: Signs/Symptoms:R sided abd pain, n/v.   COMPARISON: None.   ACCESSION NUMBER(S): [id number]   ORDERING CLINICIAN: ELYSE KLERMAN   TECHNIQUE: Multiple images of the right upper quadrant were obtained.   FINDINGS: LIVER: Normal size. Normal echogenicity, and echotexture. No focal abnormalities.    BILE DUCTS: No intrahepatic or extrahepatic bile duct dilatation. Extrahepatic bile duct = 5 mm.   GALLBLADDER: There is cholelithiasis present there is mild gallbladder wall thickening measuring 4 mm. Gallbladder is not distended and sonographic Cole's sign is not elicited.   PANCREAS: Normal head and body. Limited evaluation of the pancreatic tail due to bowel gas.   RIGHT KIDNEY: Normal size, no hydronephrosis.   PERITONEUM: No upper abdominal ascites.       Cholelithiasis with mild nonspecific gallbladder wall thickening. Sonographic Cole's sign is not elicited. Correlation with liver enzymes is recommended. No biliary dilatation.   Signed by: Kenrick Duckworth 7/24/2025 3:11 AM Dictation workstation:   [id number]     Assessment/Plan   Chery Long is a 60 y.o. female with a PMHx s/f anxiety, asthma, depression, GERD and hypothyroidism who presented to [facility] ER on 7/24/25 complaining of abdominal pain associated with nausea and vomiting.  GI service was consulted for gallstone pancreatitis.  Elevated LFTs but normal T. Bili.  Cholelithiasis and possibly acute cholecystitis on imaging.  Elevated lipase but no evidence of acute pancreatitis on imaging.  Etiologies include cholelithiasis, acute cholecystitis, and gallstone pancreatitis.  Choledocholithiasis less likely.    - Recommend LC w/IOC  - ERCP pending IOC results  - Continue IV fluids  - Supportive care as per primary team  - Monitor LFTs  - General Surgery following. Plans for LC w/IOC today  - GI will follow    Case discussed and imaging reviewed with advanced GI Dr. Amari Rosas and Dr. Lucas.      ANDERS Hinds-CNP         [1]   Family History  Adopted: Yes   Problem Relation Name Age of Onset    Melanoma Mother     [2] buPROPion XL, 300 mg, oral, Daily  citalopram, 20 mg, oral, Daily  enoxaparin, 40 mg, subcutaneous, q24h  levothyroxine, 75 mcg, oral, Once per day on Monday Tuesday Thursday Friday Saturday  [START ON  7/27/2025] levothyroxine, 88 mcg, oral, Once per day on Sunday Wednesday  montelukast, 10 mg, oral, Daily  pantoprazole, 40 mg, oral, Daily before breakfast   Or  pantoprazole, 40 mg, intravenous, Daily before breakfast  [3] sodium chloride 0.9%, 100 mL/hr, Last Rate: 100 mL/hr (07/24/25 0621)  [4] PRN medications: acetaminophen **OR** acetaminophen **OR** acetaminophen, albuterol, fluticasone, HYDROmorphone, HYDROmorphone, LORazepam, ondansetron **OR** ondansetron

## 2025-07-24 NOTE — SIGNIFICANT EVENT
Brief progress note as H and P submitted after midnight.    Patient admitted for galstone pancreatitis, lipase 567-->680, LFTs are increasing ( ). She has not required any PRN pain meds. No evidence of infection. Plan for OR today.     Wilbert Anderson, DO

## 2025-07-24 NOTE — CONSULTS
Reason For Consult  Paula    History Of Present Illness  Chery Long is a 60 y.o. female presenting with abdominal pain - reports that this is her 3rd episode this week. Reports generalized abdominal pain (worse on the right), worse after eating. Endorses nausea and multiple episodes of vomiting. Has had chills without fevers. Also had diarrhea. Only prior abdominal procedure was a procedure regarding her fallopian tubes when she was trying to get pregnant. She is afebrile, not tachycardic nor significantly hypotensive. WBC 7, H/H 13.9/42, alk phos 174, , , Tbili 0.7, lipase 567. US gallbladder showing cholelithiasis with mild non-specific gallbladder wall thickening without biliary dilatation. CT A/P showed the same, as well as elevated pancreatic enzymes without corresponding abnormality on CT. Due to this, general surgery was consulted.      Past Medical History  She has a past medical history of Anxiety, Asthma, Depression, Disease of thyroid gland, GERD (gastroesophageal reflux disease), Personal history of diseases of the skin and subcutaneous tissue, Personal history of other diseases of the female genital tract (06/24/2014), Personal history of other diseases of the female genital tract (04/07/2015), Personal history of other endocrine, nutritional and metabolic disease (03/31/2018), Personal history of other mental and behavioral disorders, and Sinusitis.    Surgical History  She has a past surgical history that includes Laparoscopy diagnostic / biopsy / aspiration / lysis (03/31/2018).     Social History  She reports that she has never smoked. She has never used smokeless tobacco. She reports current alcohol use. She reports that she does not use drugs.    Family History  Family History[1]     Allergies  Grass pollen, Lactose, and Other    Review of Systems  ABD: + pain, nausea, vomiting, diarrhea.      Physical Exam  Constitutional: Awake/alert/oriented x3, no distress,  "cooperative.  Skin: Warm and dry.  Eyes: EOMI, clear sclera.  ENMT: Mucus membranes moist, no apparent injury.  Head/Neck: Neck supple, no apparent injury.  Respiratory: Unlabored breathing.  Cardiovascular: Regular rate.  GI: Non distended, soft, non-tender to palpation.  Musculoskeletal: ROM intact, normal strength.  Extremities: BEAL.  Neurological: Alert and oriented x3, no focal deficits.  Psychological: Appropriate mood and behavior.      Last Recorded Vitals  Blood pressure 110/61, pulse 76, temperature 36.5 °C (97.7 °F), temperature source Temporal, resp. rate 16, height 1.702 m (5' 7\"), weight 72.6 kg (160 lb), SpO2 97%, not currently breastfeeding.    Relevant Results  Results for orders placed or performed during the hospital encounter of 07/24/25 (from the past 24 hours)   CBC and Auto Differential   Result Value Ref Range    WBC 7.0 4.4 - 11.3 x10*3/uL    nRBC 0.0 0.0 - 0.0 /100 WBCs    RBC 4.40 4.00 - 5.20 x10*6/uL    Hemoglobin 13.9 12.0 - 16.0 g/dL    Hematocrit 42.0 36.0 - 46.0 %    MCV 96 80 - 100 fL    MCH 31.6 26.0 - 34.0 pg    MCHC 33.1 32.0 - 36.0 g/dL    RDW 12.7 11.5 - 14.5 %    Platelets 223 150 - 450 x10*3/uL    Neutrophils % 65.1 40.0 - 80.0 %    Immature Granulocytes %, Automated 0.3 0.0 - 0.9 %    Lymphocytes % 25.2 13.0 - 44.0 %    Monocytes % 8.1 2.0 - 10.0 %    Eosinophils % 0.9 0.0 - 6.0 %    Basophils % 0.4 0.0 - 2.0 %    Neutrophils Absolute 4.58 1.20 - 7.70 x10*3/uL    Immature Granulocytes Absolute, Automated 0.02 0.00 - 0.70 x10*3/uL    Lymphocytes Absolute 1.77 1.20 - 4.80 x10*3/uL    Monocytes Absolute 0.57 0.10 - 1.00 x10*3/uL    Eosinophils Absolute 0.06 0.00 - 0.70 x10*3/uL    Basophils Absolute 0.03 0.00 - 0.10 x10*3/uL   Comprehensive metabolic panel   Result Value Ref Range    Glucose 101 (H) 74 - 99 mg/dL    Sodium 141 136 - 145 mmol/L    Potassium 3.9 3.5 - 5.3 mmol/L    Chloride 107 98 - 107 mmol/L    Bicarbonate 27 21 - 32 mmol/L    Anion Gap 11 10 - 20 mmol/L    Urea " Nitrogen 14 6 - 23 mg/dL    Creatinine 0.77 0.50 - 1.05 mg/dL    eGFR 90 >60 mL/min/1.73m*2    Calcium 9.5 8.6 - 10.3 mg/dL    Albumin 4.1 3.4 - 5.0 g/dL    Alkaline Phosphatase 174 (H) 33 - 110 U/L    Total Protein 6.7 6.4 - 8.2 g/dL     (H) 9 - 39 U/L    Bilirubin, Total 0.7 0.0 - 1.2 mg/dL     (H) 7 - 45 U/L   Lactate   Result Value Ref Range    Lactate 1.4 0.4 - 2.0 mmol/L   Lipase   Result Value Ref Range    Lipase 567 (H) 9 - 82 U/L      CT abdomen pelvis w IV contrast  Result Date: 7/24/2025  Interpreted By:  Kenrick Duckworth, STUDY: CT ABDOMEN PELVIS W IV CONTRAST;  7/24/2025 3:11 am   INDICATION: Signs/Symptoms:R sided abd pain, n/v.   COMPARISON: None.   ACCESSION NUMBER(S): [id number]   ORDERING CLINICIAN: ELYSE KLERMAN   TECHNIQUE: CT of the abdomen and pelvis was performed after administration of intravenous contrast. Standard contiguous axial images were obtained through the abdomen and pelvis. Coronal and sagittal reconstructions were performed.   FINDINGS: LOWER CHEST: No acute abnormality of the lung bases. BONES: No acute osseous abnormality. There is osteopenia. ABDOMINAL WALL: Within normal limits. LYMPH NODES: No pathologically enlarged lymph nodes in the abdomen or pelvis.   ABDOMEN:   LIVER: Normal size and contour. 1.3 cm hypodense lesion in the inferior right hepatic lobe nonspecific, likely cyst or hemangioma. BILE DUCTS: Normal caliber. GALLBLADDER: Small gallbladder calculi and mild gallbladder wall thickening better evaluate on same day right upper quadrant ultrasound. PANCREAS: No evidence of pancreatic duct dilatation or peripancreatic edema. SPLEEN: Within normal limits. ADRENALS: Within normal limits. KIDNEYS and URETERS: No evidence of hydronephrosis or nephrolithiasis. Normal size and enhancement pattern.     VESSELS: No abdominal aortic aneurysm. Mesenteric vessels appear patent.   RETROPERITONEUM: No evidence of retroperitoneal hematoma.   PELVIS:   REPRODUCTIVE  ORGANS: No pelvic masses. BLADDER: No gross abnormality.   BOWEL: Stomach is incompletely distended limiting assessment for wall thickening. No bowel dilatation or obstruction. Appendix not visualized without secondary signs of appendicitis. Formed stool throughout the colon without wall thickening or acute inflammatory change. Mild sigmoid diverticulosis without diverticulitis. PERITONEUM: No ascites or free air, no fluid collection.       1.  Cholelithiasis and mild gallbladder wall thickening better seen on concurrent right upper quadrant ultrasound. This finding is nonspecific there is no other evidence of acute abnormality in the abdomen/pelvis. Elevated pancreatic enzymes without corresponding abnormality on the CT scan. Early pancreatitis is not always detected on the CT scan.     Signed by: Kenrick Duckworth 7/24/2025 3:19 AM Dictation workstation:   [id number]    US gallbladder  Result Date: 7/24/2025  Interpreted By:  Kenrick Duckworth, STUDY: No  US GALLBLADDER;  7/24/2025 3:02 am   INDICATION: Signs/Symptoms:R sided abd pain, n/v.   COMPARISON: None.   ACCESSION NUMBER(S): [id number]   ORDERING CLINICIAN: ELYSE KLERMAN   TECHNIQUE: Multiple images of the right upper quadrant were obtained.   FINDINGS: LIVER: Normal size. Normal echogenicity, and echotexture. No focal abnormalities.   BILE DUCTS: No intrahepatic or extrahepatic bile duct dilatation. Extrahepatic bile duct = 5 mm.   GALLBLADDER: There is cholelithiasis present there is mild gallbladder wall thickening measuring 4 mm. Gallbladder is not distended and sonographic Cole's sign is not elicited.   PANCREAS: Normal head and body. Limited evaluation of the pancreatic tail due to bowel gas.   RIGHT KIDNEY: Normal size, no hydronephrosis.   PERITONEUM: No upper abdominal ascites.       Cholelithiasis with mild nonspecific gallbladder wall thickening. Sonographic Cole's sign is not elicited. Correlation with liver enzymes is recommended. No biliary  dilatation.   Signed by: Kenrick Duckworth 7/24/2025 3:11 AM Dictation workstation:   [id number]        Assessment/Plan   - Abdomen non distended, soft, non-tender to palpation.  - Pain and nausea control as needed  - IVF  - NPO   - GI consulted  - Further plan to come following surgeon evaluation today  - Trend labs    Addendum: Discussed with Dr. Camargo, will add onto OR schedule today for laparoscopic cholecystectomy.    I spent 45 minutes in the professional and overall care of this patient.      Anais Bocanegra, APRN-CNP         [1]   Family History  Adopted: Yes   Problem Relation Name Age of Onset    Melanoma Mother

## 2025-07-25 ENCOUNTER — ANESTHESIA (OUTPATIENT)
Dept: OPERATING ROOM | Facility: HOSPITAL | Age: 60
End: 2025-07-25
Payer: COMMERCIAL

## 2025-07-25 ENCOUNTER — APPOINTMENT (OUTPATIENT)
Dept: RADIOLOGY | Facility: HOSPITAL | Age: 60
DRG: 417 | End: 2025-07-25
Payer: COMMERCIAL

## 2025-07-25 ENCOUNTER — ANESTHESIA EVENT (OUTPATIENT)
Dept: OPERATING ROOM | Facility: HOSPITAL | Age: 60
End: 2025-07-25
Payer: COMMERCIAL

## 2025-07-25 LAB
ALBUMIN SERPL BCP-MCNC: 4 G/DL (ref 3.4–5)
ALP SERPL-CCNC: 191 U/L (ref 33–110)
ALT SERPL W P-5'-P-CCNC: 412 U/L (ref 7–45)
ANION GAP SERPL CALC-SCNC: 15 MMOL/L (ref 10–20)
AST SERPL W P-5'-P-CCNC: 128 U/L (ref 9–39)
ATRIAL RATE: 62 BPM
ATRIAL RATE: 69 BPM
BASOPHILS # BLD AUTO: 0.02 X10*3/UL (ref 0–0.1)
BASOPHILS NFR BLD AUTO: 0.5 %
BILIRUB DIRECT SERPL-MCNC: 0.1 MG/DL (ref 0–0.3)
BILIRUB SERPL-MCNC: 0.7 MG/DL (ref 0–1.2)
BUN SERPL-MCNC: 9 MG/DL (ref 6–23)
CALCIUM SERPL-MCNC: 9.3 MG/DL (ref 8.6–10.3)
CHLORIDE SERPL-SCNC: 107 MMOL/L (ref 98–107)
CO2 SERPL-SCNC: 22 MMOL/L (ref 21–32)
CREAT SERPL-MCNC: 0.7 MG/DL (ref 0.5–1.05)
EGFRCR SERPLBLD CKD-EPI 2021: >90 ML/MIN/1.73M*2
EOSINOPHIL # BLD AUTO: 0.07 X10*3/UL (ref 0–0.7)
EOSINOPHIL NFR BLD AUTO: 1.9 %
ERYTHROCYTE [DISTWIDTH] IN BLOOD BY AUTOMATED COUNT: 12.8 % (ref 11.5–14.5)
GLUCOSE SERPL-MCNC: 88 MG/DL (ref 74–99)
HCT VFR BLD AUTO: 39.1 % (ref 36–46)
HGB BLD-MCNC: 12.9 G/DL (ref 12–16)
IMM GRANULOCYTES # BLD AUTO: 0.01 X10*3/UL (ref 0–0.7)
IMM GRANULOCYTES NFR BLD AUTO: 0.3 % (ref 0–0.9)
LIPASE SERPL-CCNC: 31 U/L (ref 9–82)
LYMPHOCYTES # BLD AUTO: 1.61 X10*3/UL (ref 1.2–4.8)
LYMPHOCYTES NFR BLD AUTO: 43.9 %
MCH RBC QN AUTO: 31.1 PG (ref 26–34)
MCHC RBC AUTO-ENTMCNC: 33 G/DL (ref 32–36)
MCV RBC AUTO: 94 FL (ref 80–100)
MONOCYTES # BLD AUTO: 0.29 X10*3/UL (ref 0.1–1)
MONOCYTES NFR BLD AUTO: 7.9 %
NEUTROPHILS # BLD AUTO: 1.67 X10*3/UL (ref 1.2–7.7)
NEUTROPHILS NFR BLD AUTO: 45.5 %
NRBC BLD-RTO: 0 /100 WBCS (ref 0–0)
P AXIS: 69 DEGREES
P AXIS: 80 DEGREES
P OFFSET: 203 MS
P OFFSET: 205 MS
P ONSET: 150 MS
P ONSET: 151 MS
PLATELET # BLD AUTO: 196 X10*3/UL (ref 150–450)
POTASSIUM SERPL-SCNC: 3.9 MMOL/L (ref 3.5–5.3)
PR INTERVAL: 140 MS
PR INTERVAL: 146 MS
PROT SERPL-MCNC: 6.4 G/DL (ref 6.4–8.2)
Q ONSET: 220 MS
Q ONSET: 224 MS
QRS COUNT: 10 BEATS
QRS COUNT: 11 BEATS
QRS DURATION: 76 MS
QRS DURATION: 82 MS
QT INTERVAL: 412 MS
QT INTERVAL: 432 MS
QTC CALCULATION(BAZETT): 438 MS
QTC CALCULATION(BAZETT): 441 MS
QTC FREDERICIA: 431 MS
QTC FREDERICIA: 437 MS
R AXIS: 39 DEGREES
R AXIS: 70 DEGREES
RBC # BLD AUTO: 4.15 X10*6/UL (ref 4–5.2)
SODIUM SERPL-SCNC: 140 MMOL/L (ref 136–145)
T AXIS: 66 DEGREES
T AXIS: 70 DEGREES
T OFFSET: 430 MS
T OFFSET: 436 MS
VENTRICULAR RATE: 62 BPM
VENTRICULAR RATE: 69 BPM
WBC # BLD AUTO: 3.7 X10*3/UL (ref 4.4–11.3)

## 2025-07-25 PROCEDURE — 3600000004 HC OR TIME - INITIAL BASE CHARGE - PROCEDURE LEVEL FOUR: Performed by: SURGERY

## 2025-07-25 PROCEDURE — 85025 COMPLETE CBC W/AUTO DIFF WBC: CPT | Performed by: STUDENT IN AN ORGANIZED HEALTH CARE EDUCATION/TRAINING PROGRAM

## 2025-07-25 PROCEDURE — 1100000001 HC PRIVATE ROOM DAILY

## 2025-07-25 PROCEDURE — 36415 COLL VENOUS BLD VENIPUNCTURE: CPT | Performed by: STUDENT IN AN ORGANIZED HEALTH CARE EDUCATION/TRAINING PROGRAM

## 2025-07-25 PROCEDURE — 7100000001 HC RECOVERY ROOM TIME - INITIAL BASE CHARGE: Performed by: SURGERY

## 2025-07-25 PROCEDURE — 2500000004 HC RX 250 GENERAL PHARMACY W/ HCPCS (ALT 636 FOR OP/ED): Mod: JZ | Performed by: ANESTHESIOLOGY

## 2025-07-25 PROCEDURE — 3700000001 HC GENERAL ANESTHESIA TIME - INITIAL BASE CHARGE: Performed by: SURGERY

## 2025-07-25 PROCEDURE — 2500000001 HC RX 250 WO HCPCS SELF ADMINISTERED DRUGS (ALT 637 FOR MEDICARE OP)

## 2025-07-25 PROCEDURE — 88304 TISSUE EXAM BY PATHOLOGIST: CPT | Mod: TC,AHULAB | Performed by: SURGERY

## 2025-07-25 PROCEDURE — 0FT44ZZ RESECTION OF GALLBLADDER, PERCUTANEOUS ENDOSCOPIC APPROACH: ICD-10-PCS | Performed by: SURGERY

## 2025-07-25 PROCEDURE — 74300 X-RAY BILE DUCTS/PANCREAS: CPT | Performed by: SURGERY

## 2025-07-25 PROCEDURE — 3700000002 HC GENERAL ANESTHESIA TIME - EACH INCREMENTAL 1 MINUTE: Performed by: SURGERY

## 2025-07-25 PROCEDURE — 80053 COMPREHEN METABOLIC PANEL: CPT | Performed by: STUDENT IN AN ORGANIZED HEALTH CARE EDUCATION/TRAINING PROGRAM

## 2025-07-25 PROCEDURE — 2500000004 HC RX 250 GENERAL PHARMACY W/ HCPCS (ALT 636 FOR OP/ED): Performed by: INTERNAL MEDICINE

## 2025-07-25 PROCEDURE — 82248 BILIRUBIN DIRECT: CPT | Performed by: STUDENT IN AN ORGANIZED HEALTH CARE EDUCATION/TRAINING PROGRAM

## 2025-07-25 PROCEDURE — 2720000007 HC OR 272 NO HCPCS: Performed by: SURGERY

## 2025-07-25 PROCEDURE — 2500000005 HC RX 250 GENERAL PHARMACY W/O HCPCS: Performed by: SURGERY

## 2025-07-25 PROCEDURE — 47563 LAPARO CHOLECYSTECTOMY/GRAPH: CPT | Performed by: SURGERY

## 2025-07-25 PROCEDURE — 83690 ASSAY OF LIPASE: CPT | Performed by: STUDENT IN AN ORGANIZED HEALTH CARE EDUCATION/TRAINING PROGRAM

## 2025-07-25 PROCEDURE — 2500000004 HC RX 250 GENERAL PHARMACY W/ HCPCS (ALT 636 FOR OP/ED): Performed by: SURGERY

## 2025-07-25 PROCEDURE — 7100000002 HC RECOVERY ROOM TIME - EACH INCREMENTAL 1 MINUTE: Performed by: SURGERY

## 2025-07-25 PROCEDURE — 2550000001 HC RX 255 CONTRASTS: Performed by: SURGERY

## 2025-07-25 PROCEDURE — 2500000004 HC RX 250 GENERAL PHARMACY W/ HCPCS (ALT 636 FOR OP/ED)

## 2025-07-25 PROCEDURE — 74300 X-RAY BILE DUCTS/PANCREAS: CPT

## 2025-07-25 PROCEDURE — BF101ZZ FLUOROSCOPY OF BILE DUCTS USING LOW OSMOLAR CONTRAST: ICD-10-PCS | Performed by: SURGERY

## 2025-07-25 PROCEDURE — 3600000009 HC OR TIME - EACH INCREMENTAL 1 MINUTE - PROCEDURE LEVEL FOUR: Performed by: SURGERY

## 2025-07-25 PROCEDURE — 2500000004 HC RX 250 GENERAL PHARMACY W/ HCPCS (ALT 636 FOR OP/ED): Performed by: ANESTHESIOLOGY

## 2025-07-25 PROCEDURE — 2500000002 HC RX 250 W HCPCS SELF ADMINISTERED DRUGS (ALT 637 FOR MEDICARE OP, ALT 636 FOR OP/ED): Performed by: INTERNAL MEDICINE

## 2025-07-25 PROCEDURE — 99232 SBSQ HOSP IP/OBS MODERATE 35: CPT | Performed by: STUDENT IN AN ORGANIZED HEALTH CARE EDUCATION/TRAINING PROGRAM

## 2025-07-25 RX ORDER — ROCURONIUM BROMIDE 10 MG/ML
INJECTION, SOLUTION INTRAVENOUS AS NEEDED
Status: DISCONTINUED | OUTPATIENT
Start: 2025-07-25 | End: 2025-07-25

## 2025-07-25 RX ORDER — SODIUM CHLORIDE 0.9 G/100ML
INJECTION, SOLUTION IRRIGATION AS NEEDED
Status: DISCONTINUED | OUTPATIENT
Start: 2025-07-25 | End: 2025-07-25 | Stop reason: HOSPADM

## 2025-07-25 RX ORDER — DEXMEDETOMIDINE IN 0.9 % NACL 20 MCG/5ML
SYRINGE (ML) INTRAVENOUS AS NEEDED
Status: DISCONTINUED | OUTPATIENT
Start: 2025-07-25 | End: 2025-07-25

## 2025-07-25 RX ORDER — CEFAZOLIN 1 G/1
INJECTION, POWDER, FOR SOLUTION INTRAVENOUS AS NEEDED
Status: DISCONTINUED | OUTPATIENT
Start: 2025-07-25 | End: 2025-07-25

## 2025-07-25 RX ORDER — PROPOFOL 10 MG/ML
INJECTION, EMULSION INTRAVENOUS AS NEEDED
Status: DISCONTINUED | OUTPATIENT
Start: 2025-07-25 | End: 2025-07-25

## 2025-07-25 RX ORDER — ONDANSETRON 4 MG/1
4 TABLET, FILM COATED ORAL EVERY 8 HOURS PRN
Status: DISCONTINUED | OUTPATIENT
Start: 2025-07-25 | End: 2025-07-25 | Stop reason: SDUPTHER

## 2025-07-25 RX ORDER — KETOROLAC TROMETHAMINE 30 MG/ML
INJECTION, SOLUTION INTRAMUSCULAR; INTRAVENOUS AS NEEDED
Status: DISCONTINUED | OUTPATIENT
Start: 2025-07-25 | End: 2025-07-25

## 2025-07-25 RX ORDER — OXYCODONE HYDROCHLORIDE 5 MG/1
5 TABLET ORAL EVERY 4 HOURS PRN
Status: DISCONTINUED | OUTPATIENT
Start: 2025-07-25 | End: 2025-07-25 | Stop reason: HOSPADM

## 2025-07-25 RX ORDER — ONDANSETRON HYDROCHLORIDE 2 MG/ML
INJECTION, SOLUTION INTRAVENOUS AS NEEDED
Status: DISCONTINUED | OUTPATIENT
Start: 2025-07-25 | End: 2025-07-25

## 2025-07-25 RX ORDER — MIDAZOLAM HYDROCHLORIDE 2 MG/2ML
INJECTION, SOLUTION INTRAMUSCULAR; INTRAVENOUS AS NEEDED
Status: DISCONTINUED | OUTPATIENT
Start: 2025-07-25 | End: 2025-07-25

## 2025-07-25 RX ORDER — ONDANSETRON HYDROCHLORIDE 2 MG/ML
4 INJECTION, SOLUTION INTRAVENOUS EVERY 8 HOURS PRN
Status: DISCONTINUED | OUTPATIENT
Start: 2025-07-25 | End: 2025-07-25 | Stop reason: SDUPTHER

## 2025-07-25 RX ORDER — PROCHLORPERAZINE EDISYLATE 5 MG/ML
10 INJECTION INTRAMUSCULAR; INTRAVENOUS EVERY 6 HOURS PRN
Status: DISCONTINUED | OUTPATIENT
Start: 2025-07-25 | End: 2025-07-25

## 2025-07-25 RX ORDER — PROCHLORPERAZINE MALEATE 10 MG
10 TABLET ORAL EVERY 6 HOURS PRN
Status: DISCONTINUED | OUTPATIENT
Start: 2025-07-25 | End: 2025-07-25

## 2025-07-25 RX ORDER — ONDANSETRON HYDROCHLORIDE 2 MG/ML
4 INJECTION, SOLUTION INTRAVENOUS ONCE AS NEEDED
Status: COMPLETED | OUTPATIENT
Start: 2025-07-25 | End: 2025-07-25

## 2025-07-25 RX ORDER — OXYCODONE HYDROCHLORIDE 5 MG/1
10 TABLET ORAL EVERY 4 HOURS PRN
Status: DISCONTINUED | OUTPATIENT
Start: 2025-07-25 | End: 2025-07-26 | Stop reason: HOSPADM

## 2025-07-25 RX ORDER — LIDOCAINE HYDROCHLORIDE 20 MG/ML
INJECTION, SOLUTION INFILTRATION; PERINEURAL AS NEEDED
Status: DISCONTINUED | OUTPATIENT
Start: 2025-07-25 | End: 2025-07-25

## 2025-07-25 RX ORDER — DIPHENHYDRAMINE HYDROCHLORIDE 50 MG/ML
25 INJECTION, SOLUTION INTRAMUSCULAR; INTRAVENOUS ONCE AS NEEDED
Status: DISCONTINUED | OUTPATIENT
Start: 2025-07-25 | End: 2025-07-25 | Stop reason: HOSPADM

## 2025-07-25 RX ORDER — PROMETHAZINE HYDROCHLORIDE 25 MG/ML
12.5 INJECTION, SOLUTION INTRAMUSCULAR; INTRAVENOUS EVERY 6 HOURS PRN
Status: DISCONTINUED | OUTPATIENT
Start: 2025-07-25 | End: 2025-07-26 | Stop reason: HOSPADM

## 2025-07-25 RX ORDER — SODIUM CHLORIDE, SODIUM LACTATE, POTASSIUM CHLORIDE, CALCIUM CHLORIDE 600; 310; 30; 20 MG/100ML; MG/100ML; MG/100ML; MG/100ML
50 INJECTION, SOLUTION INTRAVENOUS CONTINUOUS
Status: DISCONTINUED | OUTPATIENT
Start: 2025-07-25 | End: 2025-07-26 | Stop reason: HOSPADM

## 2025-07-25 RX ORDER — PROCHLORPERAZINE 25 MG/1
25 SUPPOSITORY RECTAL EVERY 12 HOURS PRN
Status: DISCONTINUED | OUTPATIENT
Start: 2025-07-25 | End: 2025-07-25

## 2025-07-25 RX ORDER — ENOXAPARIN SODIUM 100 MG/ML
40 INJECTION SUBCUTANEOUS EVERY 24 HOURS
Status: DISCONTINUED | OUTPATIENT
Start: 2025-07-25 | End: 2025-07-25 | Stop reason: SDUPTHER

## 2025-07-25 RX ORDER — OXYCODONE HYDROCHLORIDE 5 MG/1
5 TABLET ORAL EVERY 4 HOURS PRN
Status: DISCONTINUED | OUTPATIENT
Start: 2025-07-25 | End: 2025-07-26 | Stop reason: HOSPADM

## 2025-07-25 RX ORDER — DROPERIDOL 2.5 MG/ML
0.62 INJECTION, SOLUTION INTRAMUSCULAR; INTRAVENOUS ONCE AS NEEDED
Status: DISCONTINUED | OUTPATIENT
Start: 2025-07-25 | End: 2025-07-25 | Stop reason: HOSPADM

## 2025-07-25 RX ORDER — FENTANYL CITRATE 50 UG/ML
INJECTION, SOLUTION INTRAMUSCULAR; INTRAVENOUS AS NEEDED
Status: DISCONTINUED | OUTPATIENT
Start: 2025-07-25 | End: 2025-07-25

## 2025-07-25 RX ORDER — ALBUTEROL SULFATE 0.83 MG/ML
2.5 SOLUTION RESPIRATORY (INHALATION) ONCE AS NEEDED
Status: DISCONTINUED | OUTPATIENT
Start: 2025-07-25 | End: 2025-07-25 | Stop reason: HOSPADM

## 2025-07-25 RX ORDER — POLYETHYLENE GLYCOL 3350 17 G/17G
17 POWDER, FOR SOLUTION ORAL DAILY
Status: DISCONTINUED | OUTPATIENT
Start: 2025-07-25 | End: 2025-07-26 | Stop reason: HOSPADM

## 2025-07-25 RX ORDER — ACETAMINOPHEN 325 MG/1
650 TABLET ORAL EVERY 4 HOURS PRN
Status: DISCONTINUED | OUTPATIENT
Start: 2025-07-25 | End: 2025-07-25 | Stop reason: HOSPADM

## 2025-07-25 RX ADMIN — MIDAZOLAM HYDROCHLORIDE 2 MG: 1 INJECTION, SOLUTION INTRAMUSCULAR; INTRAVENOUS at 17:06

## 2025-07-25 RX ADMIN — ROCURONIUM BROMIDE 50 MG: 10 INJECTION, SOLUTION INTRAVENOUS at 17:13

## 2025-07-25 RX ADMIN — KETOROLAC TROMETHAMINE 15 MG: 30 INJECTION, SOLUTION INTRAMUSCULAR at 18:29

## 2025-07-25 RX ADMIN — LEVOTHYROXINE SODIUM 75 MCG: 0.07 TABLET ORAL at 06:16

## 2025-07-25 RX ADMIN — SODIUM CHLORIDE, SODIUM LACTATE, POTASSIUM CHLORIDE, AND CALCIUM CHLORIDE 50 ML/HR: .6; .31; .03; .02 INJECTION, SOLUTION INTRAVENOUS at 20:42

## 2025-07-25 RX ADMIN — SUGAMMADEX 200 MG: 100 INJECTION, SOLUTION INTRAVENOUS at 18:44

## 2025-07-25 RX ADMIN — FENTANYL CITRATE 25 MCG: 50 INJECTION, SOLUTION INTRAMUSCULAR; INTRAVENOUS at 18:11

## 2025-07-25 RX ADMIN — HYDROMORPHONE HYDROCHLORIDE 0.5 MG: 1 INJECTION, SOLUTION INTRAMUSCULAR; INTRAVENOUS; SUBCUTANEOUS at 19:17

## 2025-07-25 RX ADMIN — PANTOPRAZOLE SODIUM 40 MG: 40 INJECTION, POWDER, FOR SOLUTION INTRAVENOUS at 05:10

## 2025-07-25 RX ADMIN — Medication 12 MCG: at 17:21

## 2025-07-25 RX ADMIN — HYDROMORPHONE HYDROCHLORIDE 0.5 MG: 1 INJECTION, SOLUTION INTRAMUSCULAR; INTRAVENOUS; SUBCUTANEOUS at 19:07

## 2025-07-25 RX ADMIN — FENTANYL CITRATE 25 MCG: 50 INJECTION, SOLUTION INTRAMUSCULAR; INTRAVENOUS at 18:35

## 2025-07-25 RX ADMIN — SODIUM CHLORIDE, POTASSIUM CHLORIDE, SODIUM LACTATE AND CALCIUM CHLORIDE: 600; 310; 30; 20 INJECTION, SOLUTION INTRAVENOUS at 17:06

## 2025-07-25 RX ADMIN — POLYETHYLENE GLYCOL 3350 17 G: 17 POWDER, FOR SOLUTION ORAL at 20:46

## 2025-07-25 RX ADMIN — Medication 8 MCG: at 17:47

## 2025-07-25 RX ADMIN — DEXAMETHASONE SODIUM PHOSPHATE 8 MG: 4 INJECTION, SOLUTION INTRAMUSCULAR; INTRAVENOUS at 17:19

## 2025-07-25 RX ADMIN — ONDANSETRON 4 MG: 2 INJECTION, SOLUTION INTRAMUSCULAR; INTRAVENOUS at 18:29

## 2025-07-25 RX ADMIN — CEFAZOLIN 2 G: 330 INJECTION, POWDER, FOR SOLUTION INTRAMUSCULAR; INTRAVENOUS at 17:19

## 2025-07-25 RX ADMIN — ONDANSETRON 4 MG: 2 INJECTION, SOLUTION INTRAMUSCULAR; INTRAVENOUS at 19:22

## 2025-07-25 RX ADMIN — LIDOCAINE HYDROCHLORIDE 60 MG: 20 INJECTION, SOLUTION INFILTRATION; PERINEURAL at 17:13

## 2025-07-25 RX ADMIN — PROPOFOL 150 MG: 10 INJECTION, EMULSION INTRAVENOUS at 17:13

## 2025-07-25 RX ADMIN — PROPOFOL 50 MG: 10 INJECTION, EMULSION INTRAVENOUS at 18:11

## 2025-07-25 RX ADMIN — ACETAMINOPHEN 650 MG: 325 TABLET ORAL at 23:59

## 2025-07-25 ASSESSMENT — COGNITIVE AND FUNCTIONAL STATUS - GENERAL
TOILETING: A LITTLE
MOBILITY SCORE: 21
DRESSING REGULAR LOWER BODY CLOTHING: A LITTLE
CLIMB 3 TO 5 STEPS WITH RAILING: A LOT
DRESSING REGULAR UPPER BODY CLOTHING: A LITTLE
MOBILITY SCORE: 24
HELP NEEDED FOR BATHING: A LITTLE
WALKING IN HOSPITAL ROOM: A LITTLE
DAILY ACTIVITIY SCORE: 20
DAILY ACTIVITIY SCORE: 24

## 2025-07-25 ASSESSMENT — PAIN SCALES - GENERAL
PAINLEVEL_OUTOF10: 7
PAINLEVEL_OUTOF10: 0 - NO PAIN
PAINLEVEL_OUTOF10: 0 - NO PAIN
PAINLEVEL_OUTOF10: 5 - MODERATE PAIN
PAINLEVEL_OUTOF10: 0 - NO PAIN
PAINLEVEL_OUTOF10: 5 - MODERATE PAIN
PAINLEVEL_OUTOF10: 7
PAINLEVEL_OUTOF10: 7
PAINLEVEL_OUTOF10: 0 - NO PAIN

## 2025-07-25 ASSESSMENT — PAIN - FUNCTIONAL ASSESSMENT
PAIN_FUNCTIONAL_ASSESSMENT: 0-10

## 2025-07-25 ASSESSMENT — COLUMBIA-SUICIDE SEVERITY RATING SCALE - C-SSRS
1. IN THE PAST MONTH, HAVE YOU WISHED YOU WERE DEAD OR WISHED YOU COULD GO TO SLEEP AND NOT WAKE UP?: NO
2. HAVE YOU ACTUALLY HAD ANY THOUGHTS OF KILLING YOURSELF?: NO
6. HAVE YOU EVER DONE ANYTHING, STARTED TO DO ANYTHING, OR PREPARED TO DO ANYTHING TO END YOUR LIFE?: NO

## 2025-07-25 ASSESSMENT — ACTIVITIES OF DAILY LIVING (ADL): LACK_OF_TRANSPORTATION: NO

## 2025-07-25 ASSESSMENT — PAIN DESCRIPTION - ORIENTATION: ORIENTATION: MID

## 2025-07-25 ASSESSMENT — PAIN DESCRIPTION - LOCATION: LOCATION: ABDOMEN

## 2025-07-25 NOTE — OP NOTE
Laparoscopic Cholecystectomy Operative Note     Date: 2025 - 2025  OR Location: [facility] OR    Name: Chery Long, : 1965, Age: 60 y.o., MRN: [id number], Sex: female    Diagnosis  Pre-op Diagnosis      * Gallstone pancreatitis ([facility]HS-HCC) [K85.10] Post-op Diagnosis     * Gallstone pancreatitis ([facility]-HCC) [K85.10]     Procedures  Laparoscopic Cholecystectomy With Cholangiogram          Surgeons      * Amari Camargo - Primary    Resident/Fellow/Other Assistant:  Surgeons and Role:  * No surgeons found with a matching role *    Staff:   Circulator: Caroline Diana Person: Vale    Anesthesia Staff: Anesthesiologist: Cuong Long MD  CRNA: ANDERS Hoover-KORIN    Procedure Summary  Anesthesia: General  ASA: II  Estimated Blood Loss: 10 mL  Intra-op Medications:   Administrations occurring from 1630 to 1820 on 25:   Medication Name Total Dose   ioversol (Optiray-320) injection 12 mL   sodium chloride 0.9 % irrigation solution 2,000 mL   BUPivacaine HCl (Marcaine) 0.5 % (5 mg/mL) 20 mL, lidocaine-epinephrine (Xylocaine W/EPI) 1 %-1:100,000 20 mL syringe 4 mL   albuterol 2.5 mg /3 mL (0.083 %) nebulizer solution 3 mL Cannot be calculated   buPROPion XL (Wellbutrin XL) 24 hr tablet 300 mg Cannot be calculated   ceFAZolin (Ancef) vial 1 g 2 g   citalopram (CeleXA) tablet 20 mg Cannot be calculated   dexAMETHasone (Decadron) 4 mg/mL IV Syringe 2 mL 8 mg   dexMEDETOMidine 4 mcg/mL in NS syringe 20 mcg   enoxaparin (Lovenox) syringe 40 mg Cannot be calculated   fentaNYL (Sublimaze) injection 50 mcg/mL 25 mcg   fluticasone (Flonase) nasal spray 1 spray Cannot be calculated   HYDROmorphone (Dilaudid) injection 0.4 mg Cannot be calculated   HYDROmorphone PF (Dilaudid) injection 0.2 mg Cannot be calculated   LR bolus Cannot be calculated   levothyroxine (Synthroid, Levoxyl) tablet 75 mcg Cannot be calculated   levothyroxine (Synthroid, Levoxyl) tablet 88 mcg Cannot be calculated   lidocaine (Xylocaine)  injection 2 % 60 mg   LORazepam (Ativan) tablet 0.5 mg Cannot be calculated   midazolam PF (Versed) injection 1 mg/mL 2 mg   montelukast (Singulair) tablet 10 mg Cannot be calculated   propofol (Diprivan) injection 10 mg/mL 200 mg   rocuronium (ZeMuron) 50 mg/5 mL injection 50 mg   sodium chloride 0.9 % bolus 250 mL Cannot be calculated              Anesthesia Record               Intraprocedure I/O Totals       None           Specimen:   ID Type Source Tests Collected by Time   1 : GALLBLADDER Tissue GALLBLADDER CHOLECYSTECTOMY SURGICAL PATHOLOGY EXAM Amari Camargo MD 7/25/2025 1822                 Indications: Chery Long is an 60 y.o. female who is having surgery for Gallstone pancreatitis ([facility]) [K85.10].     The patient was seen in the preoperative area. The risks, benefits, complications, treatment options, non-operative alternatives, expected recovery and outcomes were discussed with the patient. The possibilities of reaction to medication, pulmonary aspiration, injury to surrounding structures, bleeding, recurrent infection, the need for additional procedures, failure to diagnose a condition, and creating a complication requiring transfusion or operation were discussed with the patient. The patient concurred with the proposed plan, giving informed consent.  The site of surgery was properly noted/marked if necessary per policy. The patient has been actively warmed in preoperative area. Preoperative antibiotics have been ordered and given within 1 hours of incision. Venous thrombosis prophylaxis have been ordered including bilateral sequential compression devices     Procedure Details: Patient is a 60-year-old female who comes in with 1 week complaint of some episodic right upper quadrant abdominal pain.  Over the past several days her symptoms have intensified.  She has had some nausea and yesterday vomiting.  Also some chills.      Workup includes a right upper quadrant ultrasound and a CT scan that  "shows gallstones with evidence of acute cholecystitis.  Noted to have mildly elevated lipase.  She feels much better at this point   I recommended laparoscopic cholecystectomy with a cholangiogram.  We discussed the procedure to include risk, benefits and alternatives.  She agrees to the operation.        Description of Procedure:  Patient taken the operating room and placed supine on operating table time out was establish general anesthesia was induced. he had received antibiotics.  The abdomen was prepped and draped in a sterile fashion. We made an infraumbilical midline incision and placed a 12 mm Jonas trocar. We established insufflation. A 5 mm subxiphoid port was placed and two 5 mm right subcostal ports were placed. She was placed in reverse Trendelenburg position. Gallbladder was noted to be  markedly distended and slight thick walled.    We retracted the gallbladder up and over the right lobe of the liver. Infundibulum was retracted laterally and inferiorly and this nicely exposed the triangle of calot.     We dissected the base of the gallbladder off of the cystic plate of the liver.  We then dissected the cystic duct and cystic artery free from surrounding structures.  Only 2 structures were noted to enter the gallbladder; cystic duct and cystic artery thus achieving the \"critical view of safety\".     We isolated the cystic artery. This was doubly ligated and divided. Next we isolated the cystic duct. We placed a clip at the base of the gallbladder/cystic duct junction and performed a ductotomy.  We then introduced a Ranfac  cholangiocatheter. This was secured using a Hem-o-earline Clip. We performed a cholangiogram that showed free filling of contrast into radicals of the liver as well as into the duodenum. There were no filling defects. We then removed our cholangiocatheter and then ligated the cystic duct using a Hem-o-earline clip. We then excised the gallbladder from the in inferior aspect of the liver " using the cautery. Once excised, we removed the gallbladder specimen using Endo Catch bag via the umbilical trocar site. We reestablished insufflation there was some oozing in the gallbladder fossa. This was controlled using cautery. We released our pneumoperitoneum, removed our ports and then closed our 10 mm fascial defect using 0 Vicryl suture in a figure-of-eight stitch pattern. Skin is reapproximated using 3 and 4-0 subcuticular Vicryl stitches followed by Dermabond glue. Patient tolerated procedure without difficulty and was returned to the recovery room in stable condition.       Complications:  None; patient tolerated the procedure well.    Disposition: PACU - hemodynamically stable.  Condition: stable           Attending Attestation: I was present and scrubbed for the entire procedure.    Amari Camargo  Phone Number: [phone]

## 2025-07-25 NOTE — ANESTHESIA POSTPROCEDURE EVALUATION
Patient: Chery Long    Procedure Summary       Date: 07/25/25 Room / Location: [facility] / [facility]    Anesthesia Start: 1706 Anesthesia Stop: 1901    Procedure: Laparoscopic Cholecystectomy (Abdomen) Diagnosis:       Gallstone pancreatitis ([facility]HS-HCC)      (Gallstone pancreatitis ([facility]HS-HCC) [K85.10])    Surgeons: Amari Camargo MD Responsible Provider: Cuong Long MD    Anesthesia Type: general ASA Status: 2            Anesthesia Type: general    Vitals Value Taken Time   /58 07/25/25 19:50   Temp 36.2 °C (97.2 °F) 07/25/25 18:57   Pulse 65 07/25/25 19:50   Resp 16 07/25/25 19:45   SpO2 95 % 07/25/25 19:50   Vitals shown include unfiled device data.    Anesthesia Post Evaluation    Patient location during evaluation: PACU  Patient participation: complete - patient participated  Level of consciousness: awake and alert  Pain management: adequate  Airway patency: patent  Cardiovascular status: acceptable and hemodynamically stable  Respiratory status: acceptable, spontaneous ventilation and nonlabored ventilation  Hydration status: acceptable  Postoperative Nausea and Vomiting: none        There were no known notable events for this encounter.

## 2025-07-25 NOTE — PROGRESS NOTES
07/25/25 1106   Discharge Planning   Living Arrangements Spouse/significant other   Support Systems Spouse/significant other   Assistance Needed None   Type of Residence Private residence   Home or Post Acute Services None   Expected Discharge Disposition Home   Does the patient need discharge transport arranged? No   Financial Resource Strain   How hard is it for you to pay for the very basics like food, housing, medical care, and heating? Not hard   Housing Stability   In the last 12 months, was there a time when you were not able to pay the mortgage or rent on time? N   At any time in the past 12 months, were you homeless or living in a shelter (including now)? N   Transportation Needs   In the past 12 months, has lack of transportation kept you from medical appointments or from getting medications? no   In the past 12 months, has lack of transportation kept you from meetings, work, or from getting things needed for daily living? No     Plan per Medical/Surgical team: surgery following--plan for lap lyubov today, GI following--plan for ERCP pending surgery results  Status: inpatient  Payor source:   employee medical plan  Discharge disposition: Home-no home going needs identified at this time  Potential Barriers:  further intervention?  ADOD: 7/27/25

## 2025-07-25 NOTE — CARE PLAN
The patient's goals for the shift include  remain HDS through out the shift     The clinical goals for the shift include Pain management      Problem: Pain - Adult  Goal: Verbalizes/displays adequate comfort level or baseline comfort level  Outcome: Progressing     Problem: Safety - Adult  Goal: Free from fall injury  Outcome: Progressing     Problem: Discharge Planning  Goal: Discharge to home or other facility with appropriate resources  Outcome: Progressing     Problem: Chronic Conditions and Co-morbidities  Goal: Patient's chronic conditions and co-morbidity symptoms are monitored and maintained or improved  Outcome: Progressing     Problem: Pain  Goal: Takes deep breaths with improved pain control throughout the shift  Outcome: Progressing  Goal: Turns in bed with improved pain control throughout the shift  Outcome: Progressing  Goal: Walks with improved pain control throughout the shift  Outcome: Progressing  Goal: Performs ADL's with improved pain control throughout shift  Outcome: Progressing  Goal: Participates in PT with improved pain control throughout the shift  Outcome: Progressing  Goal: Free from opioid side effects throughout the shift  Outcome: Progressing  Goal: Free from acute confusion related to pain meds throughout the shift  Outcome: Progressing

## 2025-07-25 NOTE — SIGNIFICANT EVENT
Patient seen at bedside.  Reports that she is going to have surgery today, and she wants to go home afterwards.    - GI will sign off.  Please reconsult if there is a need for ERCP    Case discussed with Dr. Lucas

## 2025-07-25 NOTE — ANESTHESIA PROCEDURE NOTES
Airway  Date/Time: 7/25/2025 5:16 PM  Reason: elective    Airway not difficult    Staffing  Performed: CRNA   Authorized by: Cuong Long MD    Performed by: ANDERS Hoover-KORIN  Patient location during procedure: OR    Patient Condition  Indications for airway management: anesthesia  Patient position: sniffing  Sedation level: deep     Final Airway Details   Preoxygenated: yes  Final airway type: endotracheal airway  Successful airway: ETT  Cuffed: yes   Successful intubation technique: direct laryngoscopy  Adjuncts used in placement: intubating stylet  Endotracheal tube insertion site: oral  Blade: Blaine  Blade size: #3  ETT size (mm): 7.0  Cormack-Lehane Classification: grade I - full view of glottis  Placement verified by: chest auscultation   Measured from: lips  ETT to lips (cm): 21  Number of attempts at approach: 1    Additional Comments  Atraumatic placement. Teeth intact

## 2025-07-25 NOTE — ANESTHESIA PREPROCEDURE EVALUATION
Patient: Chery Long    Procedure Information       Date: 07/25/25    Procedure: Laparoscopic Cholecystectomy (Abdomen)    Location: [facility] OR    Surgeons: Amari Camargo MD            Relevant Problems   Cardiac   (+) Breast pain      Neuro   (+) Depressive disorder   (+) Generalized anxiety disorder      Liver   (+) Gallstone pancreatitis (HHS-HCC)      Endocrine   (+) Adult hypothyroidism      Musculoskeletal   (+) CMC arthritis, thumb, degenerative      HEENT   (+) Acute sinusitis   (+) Sinus pressure      ID   (+) Vulvovaginal candidiasis       Clinical information reviewed:   Tobacco  Allergies  Meds  Problems  Med Hx  Surg Hx   Fam Hx           Medical History[1]   Surgical History[2]  Social History[3]   Current Outpatient Medications   Medication Instructions    albuterol 90 mcg/actuation inhaler Inhale.    albuterol 90 mcg/actuation inhaler Inhale 1-2 puffs every 4 hours as needed for shortness of breath    amoxicillin-pot clavulanate (Augmentin) 875-125 mg tablet Take 1 tablet by mouth twice daily with food for 10 days    buPROPion XL (Wellbutrin XL) 300 mg 24 hr tablet TAKE 1 TABLET BY MOUTH ONCE DAILY    citalopram (CeleXA) 20 mg tablet TAKE 1 TABLET BY MOUTH EVERY DAY    citalopram (CeleXA) 20 mg tablet Take 1 tablet by mouth daily    clobetasol (Temovate) 0.05 % cream Apply twice daily to affected areas    estradiol (Estrace) 0.01 % (0.1 mg/gram) vaginal cream Apply 1 applicatorful vaginally 3 times weekly    flu vacc [id number] 6mos up, PF, (Flulaval Quad [id number], PF,) syringe intramuscular    levocetirizine (Xyzal) 5 mg tablet Take 1 tablet (5 mg) by mouth once daily at bedtime.    levothyroxine (Synthroid, Levoxyl) 75 mcg tablet TAKE 1 TABLET DAILY IN THE MORNING ON AN EMPTY STOMACH, 5 DAYS A WEEK    levothyroxine (Synthroid, Levoxyl) 75 mcg tablet Take 1 tablet (75 mcg) by mouth 5 days a week.    levothyroxine (Synthroid, Levoxyl) 88 mcg tablet TAKE 1 TABLET BY MOUTH ONCE DAILY  "2 DAYS PER WEEK    levothyroxine (Synthroid, Levoxyl) 88 mcg tablet Take 1 tablet (88 mcg) by mouth 2 days a week.    LORazepam (Ativan) 0.5 mg tablet Take 1 tablet (0.5 mg) by mouth once daily as needed for anxiety    montelukast (Singulair) 10 mg tablet Take 1 tablet (10 mg) by mouth once daily at bedtime.    nemolizumab-ilto (Nemluvio) 30 mg pen injector Inject 30mg under the skin every 4 weeks.    nemolizumab-ilto (Nemluvio) 30 mg pen injector Inject 30 mg subcutaneously every 4 weeks for maintenance.    omeprazole (PriLOSEC) 40 mg DR capsule Take 1 capsule by mouth every morning    omeprazole (PriLOSEC) 40 mg DR capsule Take 1 cap by mouth once a day in the morning    tretinoin (Retin-A) 0.025 % cream Apply pea sized amount to face once a day at night.      RX Allergies[4]     Chemistry    Lab Results   Component Value Date/Time     07/25/2025 0957    K 3.9 07/25/2025 0957     07/25/2025 0957    CO2 22 07/25/2025 0957    BUN 9 07/25/2025 0957    CREATININE 0.70 07/25/2025 0957    Lab Results   Component Value Date/Time    CALCIUM 9.3 07/25/2025 0957    ALKPHOS 191 (H) 07/25/2025 0957     (H) 07/25/2025 0957     (H) 07/25/2025 0957    BILITOT 0.7 07/25/2025 0957          Lab Results   Component Value Date    HGBA1C 4.9 12/08/2023     Lab Results   Component Value Date/Time    WBC 3.7 (L) 07/25/2025 0957    HGB 12.9 07/25/2025 0957    HCT 39.1 07/25/2025 0957     07/25/2025 0957     No results found for: \"PROTIME\", \"PTT\", \"INR\"  Encounter Date: 07/24/25   Electrocardiogram, 12-lead PRN ACS symptoms   Result Value    Ventricular Rate 69    Atrial Rate 69    AK Interval 146    QRS Duration 76    QT Interval 412    QTC Calculation(Bazett) 441    P Axis 80    R Axis 70    T Axis 70    QRS Count 11    Q Onset 224    P Onset 151    P Offset 205    T Offset 430    QTC Fredericia 431    Narrative    Normal sinus rhythm  Normal ECG  No previous ECGs available      No results found for this " "or any previous visit from the past 1095 days.        Visit Vitals  BP (!) 103/40   Pulse 64   Temp 37.1 °C (98.8 °F) (Temporal)   Resp 16   Ht 1.702 m (5' 7\")   Wt 72.6 kg (160 lb)   LMP  (LMP Unknown)   SpO2 98%   BMI 25.06 kg/m²   OB Status Postmenopausal   Smoking Status Never   BSA 1.85 m²     NPO/Void Status  Carbohydrate Drink Given Prior to Surgery? : N  Date of Last Liquid: 07/25/25  Time of Last Liquid: 0900  Date of Last Solid: 07/24/25  Time of Last Solid: 1900  Last Intake Type: Clear fluids  Time of Last Void: 1600        Physical Exam    Airway  Mallampati: II  TM distance: >3 FB  Neck ROM: full  Mouth opening: 3 or more finger widths     Cardiovascular - normal exam  Rhythm: regular  Rate: normal     Dental    Pulmonary - normal exam   Abdominal - normal exam           Anesthesia Plan    History of general anesthesia?: yes  History of complications of general anesthesia?: no    ASA 2     general   (GETA with standard ASA monitoring)  intravenous induction   Postoperative pain plan includes opioids.  Anesthetic plan and risks discussed with patient.    Plan discussed with CAA and CRNA.             [1]   Past Medical History:  Diagnosis Date    Anxiety     Asthma     Depression     GERD (gastroesophageal reflux disease)     Hashimoto's thyroiditis     Hypothyroidism    [2]   Past Surgical History:  Procedure Laterality Date    COLONOSCOPY      PELVIC LAPAROSCOPY  2004   [3]   Social History  Tobacco Use    Smoking status: Never    Smokeless tobacco: Never   Substance Use Topics    Alcohol use: Yes     Comment: occasional    Drug use: Never   [4]   Allergies  Allergen Reactions    Grass Pollen Unknown    Lactose Unknown     intolerance    Other Other     Type: Environmental allergies     "

## 2025-07-25 NOTE — PROGRESS NOTES
[facility] Hospitalist Progress Note      Between 7AM-7PM please message me via Epic Secure Chat.  After 7PM please page Nocturnist on call.        Assessment/Plan     Acute Problems    Acute gallstone pancreatitis  Choledocholithiasis   Transaminitis    Chronic Problems    Hypothyroid  Asthma  MDD    Plan    - plan for surgery today  - lipase has decreased from 680 to 31  - cont trend LFTs  - may need ERCP depending on intraoperative findings    Fluids: NS  Electrolytes: Replete as needed  Nutrition: NPO  Purvis: none  Invasive lines: none  Drains: none  O2: none    DVT Prophylaxis:  SubQ Lovenox (holding pre-op)    Discharge Planning: Discharge home after surgery    Plan of care was discussed with patient    Total time spent: At least 38 minutes, providing counseling or in coordination of care. Total time on this day of visit includes record and documentation review before and after visit including documentation and time not explicitly included on EMR time stamp.      Subjective     Chery Long is a 60 y.o. female on day 1 of admission presenting with Gallstone pancreatitis ([facility]-HCC).    NAEON. Patient reports abd pain has completely resolved, has not required any PRN narcotics. Plan for OR today    Review of Systems  ROS as noted in subjective portion of note     Objective     Physical Exam  Vitals and nursing note reviewed.   Constitutional:       General: She is not in acute distress.     Appearance: Normal appearance.   HENT:      Head: Normocephalic and atraumatic.     Eyes:      General: No scleral icterus.     Extraocular Movements: Extraocular movements intact.      Pupils: Pupils are equal, round, and reactive to light.       Cardiovascular:      Rate and Rhythm: Normal rate and regular rhythm.      Pulses: Normal pulses.      Heart sounds: Normal heart sounds.   Pulmonary:      Effort: Pulmonary effort is normal.      Breath sounds: Normal breath sounds. No wheezing, rhonchi or rales.   Abdominal:       "General: Abdomen is flat. Bowel sounds are normal. There is no distension.      Tenderness: There is no abdominal tenderness. There is no guarding.     Musculoskeletal:         General: No swelling or tenderness. Normal range of motion.     Skin:     General: Skin is warm and dry.      Findings: No erythema or rash.     Neurological:      General: No focal deficit present.      Mental Status: She is alert and oriented to person, place, and time.         Last Recorded Vitals  Blood pressure 116/55, pulse 61, temperature 36.2 °C (97.2 °F), temperature source Temporal, resp. rate 18, height 1.702 m (5' 7\"), weight 72.6 kg (160 lb), SpO2 95%, not currently breastfeeding.    Medications  Scheduled Medications[1]   PRN Medications[2]                [facility]         [1] buPROPion XL, 300 mg, oral, Daily  citalopram, 20 mg, oral, Daily  enoxaparin, 40 mg, subcutaneous, q24h  levothyroxine, 75 mcg, oral, Once per day on Monday Tuesday Thursday Friday Saturday  [START ON 7/27/2025] levothyroxine, 88 mcg, oral, Once per day on Sunday Wednesday  montelukast, 10 mg, oral, Daily  pantoprazole, 40 mg, oral, Daily before breakfast   Or  pantoprazole, 40 mg, intravenous, Daily before breakfast  sodium chloride, 250 mL, intravenous, Once  [2] PRN medications: acetaminophen **OR** acetaminophen **OR** acetaminophen, albuterol, fluticasone, HYDROmorphone, HYDROmorphone, LORazepam, ondansetron **OR** ondansetron    "

## 2025-07-26 ENCOUNTER — PHARMACY VISIT (OUTPATIENT)
Dept: PHARMACY | Facility: CLINIC | Age: 60
End: 2025-07-26
Payer: COMMERCIAL

## 2025-07-26 VITALS
HEIGHT: 67 IN | RESPIRATION RATE: 18 BRPM | OXYGEN SATURATION: 96 % | SYSTOLIC BLOOD PRESSURE: 100 MMHG | DIASTOLIC BLOOD PRESSURE: 67 MMHG | BODY MASS INDEX: 25.11 KG/M2 | WEIGHT: 160 LBS | HEART RATE: 65 BPM | TEMPERATURE: 98.8 F

## 2025-07-26 LAB
ANION GAP SERPL CALC-SCNC: 12 MMOL/L (ref 10–20)
BASOPHILS # BLD AUTO: 0.02 X10*3/UL (ref 0–0.1)
BASOPHILS NFR BLD AUTO: 0.3 %
BUN SERPL-MCNC: 10 MG/DL (ref 6–23)
CALCIUM SERPL-MCNC: 9.1 MG/DL (ref 8.6–10.3)
CHLORIDE SERPL-SCNC: 105 MMOL/L (ref 98–107)
CO2 SERPL-SCNC: 26 MMOL/L (ref 21–32)
CREAT SERPL-MCNC: 0.71 MG/DL (ref 0.5–1.05)
EGFRCR SERPLBLD CKD-EPI 2021: >90 ML/MIN/1.73M*2
EOSINOPHIL # BLD AUTO: 0 X10*3/UL (ref 0–0.7)
EOSINOPHIL NFR BLD AUTO: 0 %
ERYTHROCYTE [DISTWIDTH] IN BLOOD BY AUTOMATED COUNT: 12.7 % (ref 11.5–14.5)
GLUCOSE SERPL-MCNC: 110 MG/DL (ref 74–99)
HCT VFR BLD AUTO: 32.6 % (ref 36–46)
HGB BLD-MCNC: 11 G/DL (ref 12–16)
IMM GRANULOCYTES # BLD AUTO: 0.01 X10*3/UL (ref 0–0.7)
IMM GRANULOCYTES NFR BLD AUTO: 0.1 % (ref 0–0.9)
LYMPHOCYTES # BLD AUTO: 1.27 X10*3/UL (ref 1.2–4.8)
LYMPHOCYTES NFR BLD AUTO: 16.9 %
MCH RBC QN AUTO: 31.4 PG (ref 26–34)
MCHC RBC AUTO-ENTMCNC: 33.7 G/DL (ref 32–36)
MCV RBC AUTO: 93 FL (ref 80–100)
MONOCYTES # BLD AUTO: 0.57 X10*3/UL (ref 0.1–1)
MONOCYTES NFR BLD AUTO: 7.6 %
NEUTROPHILS # BLD AUTO: 5.66 X10*3/UL (ref 1.2–7.7)
NEUTROPHILS NFR BLD AUTO: 75.1 %
NRBC BLD-RTO: 0 /100 WBCS (ref 0–0)
PLATELET # BLD AUTO: 213 X10*3/UL (ref 150–450)
POTASSIUM SERPL-SCNC: 4.3 MMOL/L (ref 3.5–5.3)
RBC # BLD AUTO: 3.5 X10*6/UL (ref 4–5.2)
SODIUM SERPL-SCNC: 139 MMOL/L (ref 136–145)
WBC # BLD AUTO: 7.5 X10*3/UL (ref 4.4–11.3)

## 2025-07-26 PROCEDURE — 2500000001 HC RX 250 WO HCPCS SELF ADMINISTERED DRUGS (ALT 637 FOR MEDICARE OP)

## 2025-07-26 PROCEDURE — 36415 COLL VENOUS BLD VENIPUNCTURE: CPT

## 2025-07-26 PROCEDURE — 2500000002 HC RX 250 W HCPCS SELF ADMINISTERED DRUGS (ALT 637 FOR MEDICARE OP, ALT 636 FOR OP/ED)

## 2025-07-26 PROCEDURE — 80048 BASIC METABOLIC PNL TOTAL CA: CPT

## 2025-07-26 PROCEDURE — RXMED WILLOW AMBULATORY MEDICATION CHARGE

## 2025-07-26 PROCEDURE — 2500000004 HC RX 250 GENERAL PHARMACY W/ HCPCS (ALT 636 FOR OP/ED)

## 2025-07-26 PROCEDURE — 99239 HOSP IP/OBS DSCHRG MGMT >30: CPT | Performed by: HOSPITALIST

## 2025-07-26 PROCEDURE — 85025 COMPLETE CBC W/AUTO DIFF WBC: CPT

## 2025-07-26 RX ORDER — ACETAMINOPHEN 500 MG
1000 TABLET ORAL 3 TIMES DAILY
Qty: 60 TABLET | Refills: 0 | Status: SHIPPED | OUTPATIENT
Start: 2025-07-26 | End: 2025-08-05

## 2025-07-26 RX ORDER — POLYETHYLENE GLYCOL 3350 17 G/17G
17 POWDER, FOR SOLUTION ORAL DAILY
COMMUNITY
Start: 2025-07-27 | End: 2025-08-26

## 2025-07-26 RX ORDER — OXYCODONE HYDROCHLORIDE 5 MG/1
5 TABLET ORAL EVERY 6 HOURS PRN
Qty: 12 TABLET | Refills: 0 | Status: SHIPPED | OUTPATIENT
Start: 2025-07-26 | End: 2025-07-29

## 2025-07-26 RX ADMIN — ACETAMINOPHEN 650 MG: 325 TABLET ORAL at 11:18

## 2025-07-26 RX ADMIN — ACETAMINOPHEN 650 MG: 325 TABLET ORAL at 06:57

## 2025-07-26 RX ADMIN — BUPROPION HYDROCHLORIDE 300 MG: 150 TABLET, EXTENDED RELEASE ORAL at 08:04

## 2025-07-26 RX ADMIN — ENOXAPARIN SODIUM 40 MG: 40 INJECTION SUBCUTANEOUS at 03:38

## 2025-07-26 RX ADMIN — POLYETHYLENE GLYCOL 3350 17 G: 17 POWDER, FOR SOLUTION ORAL at 08:03

## 2025-07-26 RX ADMIN — CITALOPRAM HYDROBROMIDE 20 MG: 20 TABLET ORAL at 08:04

## 2025-07-26 RX ADMIN — LEVOTHYROXINE SODIUM 75 MCG: 0.07 TABLET ORAL at 05:05

## 2025-07-26 RX ADMIN — OXYCODONE HYDROCHLORIDE 10 MG: 5 TABLET ORAL at 03:26

## 2025-07-26 RX ADMIN — PANTOPRAZOLE SODIUM 40 MG: 40 TABLET, DELAYED RELEASE ORAL at 05:06

## 2025-07-26 ASSESSMENT — COGNITIVE AND FUNCTIONAL STATUS - GENERAL
MOBILITY SCORE: 24
DAILY ACTIVITIY SCORE: 24

## 2025-07-26 ASSESSMENT — PAIN - FUNCTIONAL ASSESSMENT
PAIN_FUNCTIONAL_ASSESSMENT: 0-10
PAIN_FUNCTIONAL_ASSESSMENT: UNABLE TO SELF-REPORT
PAIN_FUNCTIONAL_ASSESSMENT: 0-10
PAIN_FUNCTIONAL_ASSESSMENT: 0-10

## 2025-07-26 ASSESSMENT — PAIN DESCRIPTION - LOCATION
LOCATION: ABDOMEN
LOCATION: ABDOMEN

## 2025-07-26 ASSESSMENT — PAIN SCALES - GENERAL
PAINLEVEL_OUTOF10: 8
PAINLEVEL_OUTOF10: 4
PAINLEVEL_OUTOF10: 6

## 2025-07-26 NOTE — CARE PLAN
The patient's goals for the shift include      The clinical goals for the shift include pain control      Problem: Pain - Adult  Goal: Verbalizes/displays adequate comfort level or baseline comfort level  Outcome: Progressing     Problem: Safety - Adult  Goal: Free from fall injury  Outcome: Progressing     Problem: Discharge Planning  Goal: Discharge to home or other facility with appropriate resources  Outcome: Progressing     Problem: Chronic Conditions and Co-morbidities  Goal: Patient's chronic conditions and co-morbidity symptoms are monitored and maintained or improved  Outcome: Progressing     Problem: Nutrition  Goal: Nutrient intake appropriate for maintaining nutritional needs  Outcome: Progressing     Problem: Pain  Goal: Takes deep breaths with improved pain control throughout the shift  Outcome: Progressing  Goal: Turns in bed with improved pain control throughout the shift  Outcome: Progressing  Goal: Walks with improved pain control throughout the shift  Outcome: Progressing  Goal: Performs ADL's with improved pain control throughout shift  Outcome: Progressing  Goal: Participates in PT with improved pain control throughout the shift  Outcome: Progressing  Goal: Free from opioid side effects throughout the shift  Outcome: Progressing  Goal: Free from acute confusion related to pain meds throughout the shift  Outcome: Progressing

## 2025-07-26 NOTE — PROGRESS NOTES
Doing well  Jose A breakfast    Comfortable  Incisions clean    Ok to discharge to home    Follow up with me in 10 days

## 2025-07-26 NOTE — DISCHARGE SUMMARY
Discharge Diagnosis  Acute gallstone pancreatitis  Choledocholithiasis   Abnormal LFTs  Hypothyroid  Asthma  MDD    Issues Requiring Follow-Up  - op follow up with pcp, surgery  - check cbc, cmp in 1 week    Discharge Meds     Medication List      START taking these medications     acetaminophen 500 mg tablet; Commonly known as: Tylenol; Take 2 tablets   (1,000 mg) by mouth 3 times a day for 10 days.   oxyCODONE 5 mg immediate release tablet; Commonly known as: Roxicodone;   Take 1 tablet (5 mg) by mouth every 6 hours if needed for severe pain (7 -   10) for up to 3 days.   polyethylene glycol 17 gram packet; Commonly known as: Glycolax,   Miralax; Take 17 g by mouth once daily. Hold for loose stools; Start   taking on: July 27, 2025     CHANGE how you take these medications     * levothyroxine 75 mcg tablet; Commonly known as: Synthroid, Levoxyl;   Take 1 tablet (75 mcg) by mouth 5 days a week.; What changed: Another   medication with the same name was removed. Continue taking this   medication, and follow the directions you see here.   * levothyroxine 88 mcg tablet; Commonly known as: Synthroid, Levoxyl;   Take 1 tablet (88 mcg) by mouth 2 days a week.; What changed: Another   medication with the same name was removed. Continue taking this   medication, and follow the directions you see here.   Nemluvio 30 mg pen injector; Generic drug: nemolizumab-ilto; Inject 30mg   under the skin every 4 weeks.; What changed: Another medication with the   same name was removed. Continue taking this medication, and follow the   directions you see here.   omeprazole 40 mg DR capsule; Commonly known as: PriLOSEC; Take 1 cap by   mouth once a day in the morning; What changed: Another medication with the   same name was removed. Continue taking this medication, and follow the   directions you see here.   Ventolin HFA 90 mcg/actuation inhaler; Generic drug: albuterol; Inhale   1-2 puffs every 4 hours as needed for shortness of  breath; What changed:   Another medication with the same name was removed. Continue taking this   medication, and follow the directions you see here.  * This list has 2 medication(s) that are the same as other medications   prescribed for you. Read the directions carefully, and ask your doctor or   other care provider to review them with you.     CONTINUE taking these medications     buPROPion  mg 24 hr tablet; Commonly known as: Wellbutrin XL; TAKE   1 TABLET BY MOUTH ONCE DAILY   * citalopram 20 mg tablet; Commonly known as: CeleXA; TAKE 1 TABLET BY   MOUTH EVERY DAY   * citalopram 20 mg tablet; Commonly known as: CeleXA; Take 1 tablet by   mouth daily   clobetasol 0.05 % cream; Commonly known as: Temovate; Apply twice daily   to affected areas   estradiol 0.01 % (0.1 mg/gram) vaginal cream; Commonly known as:   Estrace; Apply 1 applicatorful vaginally 3 times weekly   levocetirizine 5 mg tablet; Commonly known as: Xyzal; Take 1 tablet (5   mg) by mouth once daily at bedtime.   LORazepam 0.5 mg tablet; Commonly known as: Ativan; Take 1 tablet (0.5   mg) by mouth once daily as needed for anxiety   montelukast 10 mg tablet; Commonly known as: Singulair; Take 1 tablet   (10 mg) by mouth once daily at bedtime.   tretinoin 0.025 % cream; Commonly known as: Retin-A; Apply pea sized   amount to face once a day at night.  * This list has 2 medication(s) that are the same as other medications   prescribed for you. Read the directions carefully, and ask your doctor or   other care provider to review them with you.     STOP taking these medications     amoxicillin-clavulanate 875-125 mg tablet; Commonly known as: Augmentin   Flulaval Quad 7860-5074 (PF) syringe; Generic drug: flu vacc [id number]   6mos up (PF)   mupirocin 2 % ointment; Commonly known as: Bactroban       Test Results Pending At Discharge  Pending Labs       Order Current Status    Surgical Pathology Exam Collected (07/25/25 4321)            [facility]  Course  60 y.o. female presenting with abdominal pain - reports that this is her 3rd episode this week. Reports generalized abdominal pain (worse on the right), worse after eating. Endorses nausea and multiple episodes of vomiting. Has had chills without fevers. Also had diarrhea. Only prior abdominal procedure was a procedure regarding her fallopian tubes when she was trying to get pregnant. She was afebrile, not tachycardic nor significantly hypotensive. WBC 7, H/H 13.9/42, alk phos 174, , , Tbili 0.7, lipase 567. US gallbladder showing cholelithiasis with mild non-specific gallbladder wall thickening without biliary dilatation. CT A/P showed the same, as well as elevated pancreatic enzymes without corresponding abnormality on CT.  Surgery and GI consulted. Surgery took to OR for Laparoscopic Cholecystectomy With Cholangiogram. No filling defects on IOC on 7/25/25. POD1 doing well. No nausea, vomiting, abd pain. Tolerating po intake. DC home with OP follow up with PCP and surgery.     Pertinent Physical Exam At Time of Discharge  Physical Exam  Vitals and nursing note reviewed.   HENT:      Mouth/Throat:      Mouth: Mucous membranes are moist.      Pharynx: Oropharynx is clear.     Cardiovascular:      Rate and Rhythm: Normal rate and regular rhythm.   Pulmonary:      Effort: Pulmonary effort is normal.   Abdominal:      General: There is no distension.      Palpations: Abdomen is soft.      Tenderness: There is no abdominal tenderness.     Neurological:      Mental Status: She is alert and oriented to person, place, and time.         Outpatient Follow-Up  Future Appointments   Date Time Provider Department Center   9/3/2025 11:45 AM Evelio SANTOS MD [id number]     DISCHARGE TIME: > 30 minutes    Parminder Go DO

## 2025-07-26 NOTE — DISCHARGE INSTRUCTIONS
Instructions After Gallbladder Surgery    Wound Care:   -Ice packs to wounds every hour the first day  -Ok to shower in 24 hours  -no baths or swimming for 2 weeks  -keep wound clean and dry  -do not apply topical creams or ointments  -call if you notice redness around wound, foul-smelling drainage, or increasing pain     Diet:   -Avoid greasy, fatty foods first few weeks   -[place], soft meals first day or two after surgery    Activity   -Take it easy for the first 48 hours   -stairs and walks are fine   -resume activities gradually over the first week   -ask Dr Camargo before resuming strenuous physical exertions   -No driving while on pain medication    Medications   -Pain medicine prescription attached for severe pain   -You can also take Motrin/Ibuprofen 400mg every 6 hours for mild pain   -Resume your home medications unless otherwise directed   -To avoid constipation please take Colace 100mg twice a day (over the counter)    Other Instructions   -Call to make appointment within 1-2 days:  [phone]   -Call the doctor for the following:  severe unrelieved pain  fevers > 101F  Nausea/vomiting  wound issues  insurance/return to work forms  shortness of breath  chest pains   -Feedback on your surgical experience is appreciated!

## 2025-07-26 NOTE — PROGRESS NOTES
"Chery Long is a 60 y.o. female on day 2 of admission presenting with Gallstone pancreatitis ([facility]).    Subjective   Doing well this morning, had some severe pain in the middle of the night but was better after getting some pain medications. She is tolerating a diet without difficulty. Passing gas. Interested in being discharged.        Objective     Physical Exam  Constitutional:       Appearance: Normal appearance.   Pulmonary:      Effort: Pulmonary effort is normal.   Abdominal:      Comments: Lap sites with Dermabond, C/D/I, edges well approximated, minor bruising without drainage or hematoma.       Neurological:      Mental Status: She is oriented to person, place, and time.     Psychiatric:         Mood and Affect: Mood normal.         Behavior: Behavior normal.         Last Recorded Vitals  Blood pressure 98/63, pulse 60, temperature 36.4 °C (97.6 °F), resp. rate 16, height 1.702 m (5' 7\"), weight 72.6 kg (160 lb), SpO2 96%, not currently breastfeeding.  Intake/Output last 3 Shifts:  I/O last 3 completed shifts:  In: 1605 (22.1 mL/kg) [I.V.:355 (4.9 mL/kg); IV Piggyback:1250]  Out: 250 (3.4 mL/kg) [Urine:250 (0.1 mL/kg/hr)]  Weight: 72.6 kg     Medications:   Scheduled medications  Scheduled Medications[1]  Continuous medications  Continuous Medications[2]  PRN medications  PRN Medications[3]    Relevant Results  Results for orders placed or performed during the hospital encounter of 07/24/25 (from the past 24 hours)   Basic Metabolic Panel   Result Value Ref Range    Glucose 110 (H) 74 - 99 mg/dL    Sodium 139 136 - 145 mmol/L    Potassium 4.3 3.5 - 5.3 mmol/L    Chloride 105 98 - 107 mmol/L    Bicarbonate 26 21 - 32 mmol/L    Anion Gap 12 10 - 20 mmol/L    Urea Nitrogen 10 6 - 23 mg/dL    Creatinine 0.71 0.50 - 1.05 mg/dL    eGFR >90 >60 mL/min/1.73m*2    Calcium 9.1 8.6 - 10.3 mg/dL   CBC and Auto Differential   Result Value Ref Range    WBC 7.5 4.4 - 11.3 x10*3/uL    nRBC 0.0 0.0 - 0.0 /100 WBCs "    RBC 3.50 (L) 4.00 - 5.20 x10*6/uL    Hemoglobin 11.0 (L) 12.0 - 16.0 g/dL    Hematocrit 32.6 (L) 36.0 - 46.0 %    MCV 93 80 - 100 fL    MCH 31.4 26.0 - 34.0 pg    MCHC 33.7 32.0 - 36.0 g/dL    RDW 12.7 11.5 - 14.5 %    Platelets 213 150 - 450 x10*3/uL    Neutrophils % 75.1 40.0 - 80.0 %    Immature Granulocytes %, Automated 0.1 0.0 - 0.9 %    Lymphocytes % 16.9 13.0 - 44.0 %    Monocytes % 7.6 2.0 - 10.0 %    Eosinophils % 0.0 0.0 - 6.0 %    Basophils % 0.3 0.0 - 2.0 %    Neutrophils Absolute 5.66 1.20 - 7.70 x10*3/uL    Immature Granulocytes Absolute, Automated 0.01 0.00 - 0.70 x10*3/uL    Lymphocytes Absolute 1.27 1.20 - 4.80 x10*3/uL    Monocytes Absolute 0.57 0.10 - 1.00 x10*3/uL    Eosinophils Absolute 0.00 0.00 - 0.70 x10*3/uL    Basophils Absolute 0.02 0.00 - 0.10 x10*3/uL     [place] GI cholangiography intraop  Result Date: 7/25/2025  These images are not reportable by radiology and will not be interpreted by  Radiologists.    ECG 12 lead  Result Date: 7/25/2025  Normal sinus rhythm Normal ECG When compared with ECG of 24-JUL-2025 01:52, (unconfirmed) No significant change was found    Electrocardiogram, 12-lead PRN ACS symptoms  Result Date: 7/25/2025  Normal sinus rhythm Normal ECG No previous ECGs available    CT abdomen pelvis w IV contrast  Result Date: 7/24/2025  Interpreted By:  Kenrick Duckworth, STUDY: CT ABDOMEN PELVIS W IV CONTRAST;  7/24/2025 3:11 am   INDICATION: Signs/Symptoms:R sided abd pain, n/v.   COMPARISON: None.   ACCESSION NUMBER(S): [id number]   ORDERING CLINICIAN: ELYSE KLERMAN   TECHNIQUE: CT of the abdomen and pelvis was performed after administration of intravenous contrast. Standard contiguous axial images were obtained through the abdomen and pelvis. Coronal and sagittal reconstructions were performed.   FINDINGS: LOWER CHEST: No acute abnormality of the lung bases. BONES: No acute osseous abnormality. There is osteopenia. ABDOMINAL WALL: Within normal limits. LYMPH NODES: No  pathologically enlarged lymph nodes in the abdomen or pelvis.   ABDOMEN:   LIVER: Normal size and contour. 1.3 cm hypodense lesion in the inferior right hepatic lobe nonspecific, likely cyst or hemangioma. BILE DUCTS: Normal caliber. GALLBLADDER: Small gallbladder calculi and mild gallbladder wall thickening better evaluate on same day right upper quadrant ultrasound. PANCREAS: No evidence of pancreatic duct dilatation or peripancreatic edema. SPLEEN: Within normal limits. ADRENALS: Within normal limits. KIDNEYS and URETERS: No evidence of hydronephrosis or nephrolithiasis. Normal size and enhancement pattern.     VESSELS: No abdominal aortic aneurysm. Mesenteric vessels appear patent.   RETROPERITONEUM: No evidence of retroperitoneal hematoma.   PELVIS:   REPRODUCTIVE ORGANS: No pelvic masses. BLADDER: No gross abnormality.   BOWEL: Stomach is incompletely distended limiting assessment for wall thickening. No bowel dilatation or obstruction. Appendix not visualized without secondary signs of appendicitis. Formed stool throughout the colon without wall thickening or acute inflammatory change. Mild sigmoid diverticulosis without diverticulitis. PERITONEUM: No ascites or free air, no fluid collection.       1.  Cholelithiasis and mild gallbladder wall thickening better seen on concurrent right upper quadrant ultrasound. This finding is nonspecific there is no other evidence of acute abnormality in the abdomen/pelvis. Elevated pancreatic enzymes without corresponding abnormality on the CT scan. Early pancreatitis is not always detected on the CT scan.     Signed by: Kenrick Duckworth 7/24/2025 3:19 AM Dictation workstation:   [id number]    US gallbladder  Result Date: 7/24/2025  Interpreted By:  Kenrick Duckworth, STUDY: No  US GALLBLADDER;  7/24/2025 3:02 am   INDICATION: Signs/Symptoms:R sided abd pain, n/v.   COMPARISON: None.   ACCESSION NUMBER(S): [id number]   ORDERING CLINICIAN: ELYSE KLERMAN   TECHNIQUE: Multiple  images of the right upper quadrant were obtained.   FINDINGS: LIVER: Normal size. Normal echogenicity, and echotexture. No focal abnormalities.   BILE DUCTS: No intrahepatic or extrahepatic bile duct dilatation. Extrahepatic bile duct = 5 mm.   GALLBLADDER: There is cholelithiasis present there is mild gallbladder wall thickening measuring 4 mm. Gallbladder is not distended and sonographic Cole's sign is not elicited.   PANCREAS: Normal head and body. Limited evaluation of the pancreatic tail due to bowel gas.   RIGHT KIDNEY: Normal size, no hydronephrosis.   PERITONEUM: No upper abdominal ascites.       Cholelithiasis with mild nonspecific gallbladder wall thickening. Sonographic Cole's sign is not elicited. Correlation with liver enzymes is recommended. No biliary dilatation.   Signed by: Kenrick Duckworth 7/24/2025 3:11 AM Dictation workstation:   [id number]        Assessment & Plan  Gallstone pancreatitis ([facility])    Chery Long is a 59 yo female who is admitted with gallstone pancreatitis. She is POD #1 s/p lx lyubov with Dr Camargo. She is well appearing this morning, abdominal Lap sites closed with Dermabond, C/D/I, edges well approximated, minor bruising without drainage or hematoma.    Plan: POD #1 lx lyubov  - low fat diet  - pain medications as needed  - antiemetics  - IS 10x/hr  - walking/ oob as much as possible  - follow up with Dr Camargo in 2 weeks    Dispo: ok to be discharged when medically cleared        I spent 35 minutes in the professional and overall care of this patient.      Linda Keenan, ANDERS-CNP         [1] buPROPion XL, 300 mg, oral, Daily  citalopram, 20 mg, oral, Daily  enoxaparin, 40 mg, subcutaneous, q24h  levothyroxine, 75 mcg, oral, Once per day on Monday Tuesday Thursday Friday Saturday  [START ON 7/27/2025] levothyroxine, 88 mcg, oral, Once per day on Sunday Wednesday  montelukast, 10 mg, oral, Daily  pantoprazole, 40 mg, oral, Daily before breakfast   Or  pantoprazole, 40 mg,  intravenous, Daily before breakfast  polyethylene glycol, 17 g, oral, Daily  sodium chloride, 250 mL, intravenous, Once  [2] lactated Ringer's, 50 mL/hr, Last Rate: 50 mL/hr (07/26/25 0348)  [3] PRN medications: acetaminophen **OR** acetaminophen **OR** acetaminophen, albuterol, fluticasone, HYDROmorphone, HYDROmorphone, LORazepam, ondansetron **OR** ondansetron, oxyCODONE, oxyCODONE, promethazine

## 2025-07-26 NOTE — POST-PROCEDURE NOTE
Patient is POD 0 Laparoscopic Cholecystectomy With Cholangiogram. No filling defects      She reports alt of nausea this evening. She has been up out of bed. She reports pain is well controlled.     PE:  Constitutional: A&Ox3, calm and cooperative  Head/Neck: Neck supple, no JVD  Cardiovascular: RRR  Respiratory/Thorax: Non labored breathing on RA  Gastrointestinal: Abdomen nondistended, soft, nontender. Lap sites C/D/I, edges well approximated, covered in Dermabond  Genitourinary: Voiding independently   Musculoskeletal: ROM intact, no joint swelling, normal strength  Extremities: BEAL, No peripheral edema  Neurological: No focal deficits   Psychological: Appropriate mood and behavior  Skin: Warm and dry.       Radiology and labs reviewed. VSS, afebrile.    Plan:   - Diet: Low fat  - Continue current pain regimen   - PRN antiemetic   - DVT Proph: SCDs/ ambulate/ Lovenox  - Monitor VS every 4 hours   - Labs ordered for AM   - IS every hour while awake   - Encourage ambulation / OOB as tolerated   - Instructed on post operative care, s/s of infection  - Monitor lap sites for drainage, erythema, excessive bruising   - Continue supportive care  - F/u with Dr. Camargo outpatient in 10-14 days once d/c from hospital     Dispo: Doing well this evening anticipate discharge tomorrow    Total time spent 25 minutes, and greater than 50% of time was spent in counseling/coordination of care

## 2025-07-26 NOTE — CARE PLAN
The patient's goals for the shift include      The clinical goals for the shift include pain control    Over the shift, the patient did make progress toward the following goals.    Problem: Pain  Goal: Takes deep breaths with improved pain control throughout the shift  Outcome: Progressing  Goal: Turns in bed with improved pain control throughout the shift  Outcome: Progressing  Goal: Walks with improved pain control throughout the shift  Outcome: Progressing  Goal: Performs ADL's with improved pain control throughout shift  Outcome: Progressing  Goal: Participates in PT with improved pain control throughout the shift  Outcome: Progressing  Goal: Free from opioid side effects throughout the shift  Outcome: Progressing  Goal: Free from acute confusion related to pain meds throughout the shift  Outcome: Progressing     Problem: Pain - Adult  Goal: Verbalizes/displays adequate comfort level or baseline comfort level  Outcome: Progressing

## 2025-07-28 ENCOUNTER — PATIENT OUTREACH (OUTPATIENT)
Dept: CARE COORDINATION | Facility: CLINIC | Age: 60
End: 2025-07-28
Payer: COMMERCIAL

## 2025-07-28 PROCEDURE — RXMED WILLOW AMBULATORY MEDICATION CHARGE

## 2025-07-28 SDOH — ECONOMIC STABILITY: FOOD INSECURITY
ARE ANY OF YOUR NEEDS URGENT? FOR EXAMPLE, UNCERTAINTY OF WHERE YOU WILL GET YOUR NEXT MEAL OR NOT HAVING THE MEDICATIONS YOU NEED TO TAKE TOMORROW.: NO

## 2025-07-28 SDOH — ECONOMIC STABILITY: GENERAL: WOULD YOU LIKE HELP WITH ANY OF THE FOLLOWING NEEDS?: I DONT NEED HELP WITH ANY OF THESE

## 2025-07-28 NOTE — PROGRESS NOTES
Wrap Up  Wrap Up Additional Comments: Chery reports she is doing well, a little sore but her extra strength tylenol manages her post op pain well.  Is ambulating wilfred food and fluids well, no BM yet but is passing flatus.  She will be scheduling appts with her sx and PCP.  Verified that Dr Turner remains her pcp (7/28/2025 10:47 AM)    Medications  Medications reviewed with patient/caregiver?: Yes (7/28/2025 10:47 AM)  Is the patient having any side effects they believe may be caused by any medication additions or changes?: No (7/28/2025 10:47 AM)  Care Management Interventions: No intervention needed (7/28/2025 10:47 AM)  Is the patient taking all medications as directed (includes completed medication regime)?: Yes (7/28/2025 10:47 AM)    Appointments  Does the patient have a primary care provider?: Yes (7/28/2025 10:47 AM)  Care Management Interventions: Advised patient to make appointment (7/28/2025 10:47 AM)    Patient Teaching  Does the patient have access to their discharge instructions?: Yes (7/28/2025 10:47 AM)  Care Management Interventions: Reviewed instructions with patient (7/28/2025 10:47 AM)  What is the patient's perception of their health status since discharge?: Improving (7/28/2025 10:47 AM)  Is the patient/caregiver able to teach back the hierarchy of who to call/visit for symptoms/problems? PCP, Specialist, Home Health nurse, Urgent Care, ED, 911: Yes (7/28/2025 10:47 AM)      Sonia Olivia RN [facility]  [phone]

## 2025-07-30 LAB
ATRIAL RATE: 69 BPM
LABORATORY COMMENT REPORT: NORMAL
P AXIS: 80 DEGREES
P OFFSET: 205 MS
P ONSET: 151 MS
PATH REPORT.FINAL DX SPEC: NORMAL
PATH REPORT.GROSS SPEC: NORMAL
PATH REPORT.RELEVANT HX SPEC: NORMAL
PATH REPORT.TOTAL CANCER: NORMAL
PR INTERVAL: 146 MS
Q ONSET: 224 MS
QRS COUNT: 11 BEATS
QRS DURATION: 76 MS
QT INTERVAL: 412 MS
QTC CALCULATION(BAZETT): 441 MS
QTC FREDERICIA: 431 MS
R AXIS: 70 DEGREES
T AXIS: 70 DEGREES
T OFFSET: 430 MS
VENTRICULAR RATE: 69 BPM

## 2025-07-31 ENCOUNTER — PHARMACY VISIT (OUTPATIENT)
Dept: PHARMACY | Facility: CLINIC | Age: 60
End: 2025-07-31
Payer: COMMERCIAL

## 2025-07-31 LAB
ATRIAL RATE: 62 BPM
P AXIS: 69 DEGREES
P OFFSET: 203 MS
P ONSET: 150 MS
PR INTERVAL: 140 MS
Q ONSET: 220 MS
QRS COUNT: 10 BEATS
QRS DURATION: 82 MS
QT INTERVAL: 432 MS
QTC CALCULATION(BAZETT): 438 MS
QTC FREDERICIA: 437 MS
R AXIS: 39 DEGREES
T AXIS: 66 DEGREES
T OFFSET: 436 MS
VENTRICULAR RATE: 62 BPM

## 2025-07-31 NOTE — PROGRESS NOTES
Referring Provider:  No referring provider defined for this encounter.      History of Present Illness:  History of Present Illness  The patient presents for evaluation of nasal symptoms.    She reports a significant improvement in her condition, with no current pain. She has been using the prescribed ointment, which she finds beneficial as it provides instant moisture. However, she did not use it during her COVID-19 infection. She expresses some doubt about the effectiveness of over-the-counter gel, as previous attempts to use it were unsuccessful. She is not experiencing any environmental reactions at present but anticipates a slight worsening of her condition in the fall. She is open to trying the over-the-counter gel again.    She tested positive for COVID-19 after returning from a trip to the [place]. This was her first experience with the virus. Her initial symptom was a severe scratchy throat, which she describes as feeling like broken glass. This symptom has since resolved. She also experienced congestion and a wet cough, both of which are improving.     ?  Review of Systems:     Review of symptoms was negative except for those stated including Cardiopulmonary, Genitourinary, Gastrointestinal, Psychological, Sleep pattern, Endocrine, Eyes, Neurologic, Musculoskeletal, Skin, Hematologic/Lymphatic and Allergic/Immunologic.     Medical History:     I have reviewed the patient's updated past medical history, surgical history, family history, social history, as well as current medications and allergies as of 5/6/2025. Changes to these items have been updated and marked as reviewed in the electronic medical record.     Physical Exam:  Physical Exam  General: Patient doing well overall and is in no apparent distress.  Vital signs: Vitals were not taken for this visit.  Psych: Pleasant affect, and answers questions appropriately.  Head & Face: Symmetric facial movements.  Eyes: Pupils equal, round,  reactive. Extraocular movements intact without gaze restrictions or nystagmus. No epiphora.  Ears: External auditory canals are normal. Tympanic membranes are clear. No middle ear effusion is seen. All middle ear landmarks are normal.  Nose: Very mild crusting, improved appearance.  Oral Cavity/Oropharynx: Without lesions or masses to visual exam.  Neck: Supple without lymphadenopathy.  Lungs: Non-labored, and without evidence of stridor.  Cardiac: Pulses are strong, well-perfused.  Extremities: Without gross evidence of clubbing, cyanosis, or edema.  Neuro: Cranial nerves II-XII grossly intact; Intact facial movements.  Procedure:  Rigid nasal endoscopy (60506)  Pre-procedure diagnosis/Indication for procedure:  To evaluate areas not visualized on anterior rhinoscopy   Anesthesia:  None  Description:  A 0-degree 3-mm rigid nasal endoscope was used to examine the left and right nasal cavities.  The nasal valve areas were examined for abnormalities or collapse.  The inferior and middle turbinates were evaluated.  The middle and superior meatuses, and the sphenoethmoid recesses were examined and inspected for mucopurulence and polyps. Once the endoscope was withdrawn, the patient was noted to have tolerated the procedure well without complications and was returned to ambulatory status.    Findings:    Improved vestibulitis with very minor residual crusting. No other pertinent findings.     Assessment & Plan  1. COVID-19 infection  The patient experienced a COVID-19 infection approximately one month ago, with symptoms including a scratchy throat, congestion, and a wet cough. These symptoms have largely resolved.    2. Nasal symptoms  Nasal symptoms have shown significant improvement. Continue using mupirocin for the remainder of the week, then transition to Ayr gel or coconut oil, applying it twice daily. Alternating between mupirocin and the new ointment is an option to gradually wean off the former. If the condition  deteriorates, return sooner for further evaluation and potential initiation of additional medications.    Risks, benefits, and alternatives of treatment were discussed. Continuing mupirocin carries a risk of hypersensitivity reaction to the antibiotic. Alternatives such as Ayr gel or coconut oil were suggested, which can provide similar moisturization without the antibiotic. The patient expressed skepticism about over-the-counter gels due to past experiences but is willing to try them again. If symptoms worsen, additional medications may be considered.    Follow-up  A follow-up appointment is scheduled for the end of August 2025 or the beginning of September 2025.          Evelio Holt MD, M.Eng.   of Otolaryngology - Head & Neck Surgery  Division of Rhinology and Endoscopic Skull Base Surgery  [facility]     This medical note was created with the assistance of artificial intelligence (AI) for documentation purposes. The content has been reviewed and confirmed by the healthcare provider for accuracy and completeness. Patient consented to the use of audio recording and use of AI during their visit.

## 2025-08-01 PROCEDURE — RXMED WILLOW AMBULATORY MEDICATION CHARGE

## 2025-08-02 ENCOUNTER — PHARMACY VISIT (OUTPATIENT)
Dept: PHARMACY | Facility: CLINIC | Age: 60
End: 2025-08-02
Payer: COMMERCIAL

## 2025-08-12 ENCOUNTER — PATIENT OUTREACH (OUTPATIENT)
Dept: CARE COORDINATION | Facility: CLINIC | Age: 60
End: 2025-08-12
Payer: COMMERCIAL

## 2025-08-14 ENCOUNTER — SPECIALTY PHARMACY (OUTPATIENT)
Dept: PHARMACY | Facility: CLINIC | Age: 60
End: 2025-08-14

## 2025-08-15 ENCOUNTER — APPOINTMENT (OUTPATIENT)
Dept: SURGERY | Facility: CLINIC | Age: 60
End: 2025-08-15
Payer: COMMERCIAL

## 2025-08-20 ENCOUNTER — OFFICE VISIT (OUTPATIENT)
Dept: SURGERY | Facility: CLINIC | Age: 60
End: 2025-08-20
Payer: COMMERCIAL

## 2025-08-20 VITALS
WEIGHT: 161 LBS | BODY MASS INDEX: 25.27 KG/M2 | DIASTOLIC BLOOD PRESSURE: 59 MMHG | SYSTOLIC BLOOD PRESSURE: 108 MMHG | HEART RATE: 71 BPM | HEIGHT: 67 IN

## 2025-08-20 DIAGNOSIS — K85.10 GALLSTONE PANCREATITIS (HHS-HCC): Primary | ICD-10-CM

## 2025-08-20 PROCEDURE — 1036F TOBACCO NON-USER: CPT | Performed by: SURGERY

## 2025-08-20 PROCEDURE — 99211 OFF/OP EST MAY X REQ PHY/QHP: CPT | Performed by: SURGERY

## 2025-08-20 PROCEDURE — 3008F BODY MASS INDEX DOCD: CPT | Performed by: SURGERY

## 2025-08-20 ASSESSMENT — ENCOUNTER SYMPTOMS
LOSS OF SENSATION IN FEET: 0
OCCASIONAL FEELINGS OF UNSTEADINESS: 0
DEPRESSION: 0

## 2025-08-20 ASSESSMENT — PAIN SCALES - GENERAL: PAINLEVEL_OUTOF10: 0-NO PAIN

## 2025-08-21 ENCOUNTER — SPECIALTY PHARMACY (OUTPATIENT)
Dept: PHARMACY | Facility: CLINIC | Age: 60
End: 2025-08-21

## 2025-08-21 LAB
ALBUMIN SERPL-MCNC: 4.7 G/DL (ref 3.6–5.1)
ALBUMIN/GLOB SERPL: 2 (CALC) (ref 1–2.5)
ALP SERPL-CCNC: 110 U/L (ref 37–153)
ALT SERPL-CCNC: 20 U/L (ref 6–29)
AST SERPL-CCNC: 18 U/L (ref 10–35)
BILIRUB DIRECT SERPL-MCNC: 0.1 MG/DL
BILIRUB INDIRECT SERPL-MCNC: 0.3 MG/DL (CALC) (ref 0.2–1.2)
BILIRUB SERPL-MCNC: 0.4 MG/DL (ref 0.2–1.2)
GLOBULIN SER CALC-MCNC: 2.4 G/DL (CALC) (ref 1.9–3.7)
LIPASE SERPL-CCNC: 25 U/L (ref 7–60)
PROT SERPL-MCNC: 7.1 G/DL (ref 6.1–8.1)

## 2025-08-22 ENCOUNTER — PATIENT OUTREACH (OUTPATIENT)
Dept: CARE COORDINATION | Facility: CLINIC | Age: 60
End: 2025-08-22
Payer: COMMERCIAL

## 2025-08-22 ENCOUNTER — SPECIALTY PHARMACY (OUTPATIENT)
Dept: PHARMACY | Facility: CLINIC | Age: 60
End: 2025-08-22

## 2025-08-27 ENCOUNTER — PATIENT OUTREACH (OUTPATIENT)
Dept: CARE COORDINATION | Facility: CLINIC | Age: 60
End: 2025-08-27
Payer: COMMERCIAL

## 2025-09-03 ENCOUNTER — PHARMACY VISIT (OUTPATIENT)
Dept: PHARMACY | Facility: CLINIC | Age: 60
End: 2025-09-03
Payer: COMMERCIAL

## 2025-09-03 ENCOUNTER — APPOINTMENT (OUTPATIENT)
Dept: OTOLARYNGOLOGY | Facility: CLINIC | Age: 60
End: 2025-09-03
Payer: COMMERCIAL

## 2025-09-03 PROCEDURE — RXMED WILLOW AMBULATORY MEDICATION CHARGE

## (undated) DEVICE — SYRINGE, 20 CC, LUER LOCK

## (undated) DEVICE — SUTURE, VICRYL, 0, 27 IN, UR-6, VIOLET

## (undated) DEVICE — TUBE SET, PNEUMOCLEAR, SMOKE EVACU, HIGH-FLOW

## (undated) DEVICE — TROCAR SYSTEM, BALLOON, KII GELPORT, 12 X 100MM

## (undated) DEVICE — SYSTEM, TROCAR LAP, 5X100MM, SHIELD BLADED, KII ADVANCED FIX ABDOMINAL

## (undated) DEVICE — CATHETER, IV, ANGIOCATH, 14 G X 5.25 IN, FEP POLYMER

## (undated) DEVICE — CORD, MONOPOLAR, HIGH FREQUENCY, W/8MM PLUG F/VALLEYLAB, 8FT/244CM, STRL

## (undated) DEVICE — RETRIEVAL SYSTEM, MONARCH, 10MM DISP ENDOSCOPIC

## (undated) DEVICE — GLOVE, SURGICAL, PROTEXIS PI BLUE W/NEUTHERA, 8.0, PF, LF

## (undated) DEVICE — ELECTRODE, ELECTROSURGICAL, BLADE, INSULATED, ENT/IMA, STERILE

## (undated) DEVICE — SUTURE, VICRYL PLUS, 0, 27IN, UR-6, VIOLET, BRAIDED

## (undated) DEVICE — TOWEL, SURGICAL, NEURO, O/R, 16 X 26, BLUE, STERILE

## (undated) DEVICE — GOWN, SURGICAL, SMARTGOWN, LARGE, STERILE

## (undated) DEVICE — CANNULA, KII ADVANCED FIXATION, 5X100MM W/SEAL

## (undated) DEVICE — GLOVE, SURGICAL, PROTEXIS PI W/NEU-THERA, 7.5, PF, LF

## (undated) DEVICE — Device

## (undated) DEVICE — DRAPE, C-ARM, W/12 IN COVER, LI XTRAY TUBE

## (undated) DEVICE — PUMP, STRYKERFLOW 2 & HANDPIECE W/10FT. IRRIGATION TUBING

## (undated) DEVICE — APPLIER, 5MM ENDOSCOPIC, HEM-O-LOCK, W/15 ML CLIPS

## (undated) DEVICE — SCOPE WARMER, LAPAROSCOPE, BAG ONLY, LF

## (undated) DEVICE — SHEAR, W/UNIPOLAR CAUTERY, ENDOSHEAR, 5 MM

## (undated) DEVICE — SUTURE, MONOCRYL, 4-0, 18 IN, PS2, UNDYED

## (undated) DEVICE — CATHETER, CHOLANGIOGRAM, 18 G X 11

## (undated) DEVICE — ADHESIVE, SKIN, DERMABOND ADVANCED, 15CM, PEN-STYLE